# Patient Record
Sex: FEMALE | Race: WHITE | NOT HISPANIC OR LATINO | Employment: OTHER | URBAN - NONMETROPOLITAN AREA
[De-identification: names, ages, dates, MRNs, and addresses within clinical notes are randomized per-mention and may not be internally consistent; named-entity substitution may affect disease eponyms.]

---

## 2017-02-28 DIAGNOSIS — S86.912D: ICD-10-CM

## 2017-02-28 DIAGNOSIS — M25.569: Primary | ICD-10-CM

## 2017-03-01 ENCOUNTER — HOSPITAL ENCOUNTER (OUTPATIENT)
Dept: MRI IMAGING | Facility: HOSPITAL | Age: 62
Discharge: HOME | End: 2017-03-01
Attending: FAMILY MEDICINE | Admitting: FAMILY MEDICINE
Payer: COMMERCIAL

## 2017-03-01 PROCEDURE — 73721 MRI JNT OF LWR EXTRE W/O DYE: CPT | Mod: TC,LT

## 2017-05-22 DIAGNOSIS — I25.10 CAD (CORONARY ARTERY DISEASE): Primary | ICD-10-CM

## 2017-05-23 ENCOUNTER — TRANSFERRED RECORDS (OUTPATIENT)
Dept: HEALTH INFORMATION MANAGEMENT | Facility: HOSPITAL | Age: 62
End: 2017-05-23

## 2017-05-25 ENCOUNTER — APPOINTMENT (OUTPATIENT)
Dept: LAB | Facility: HOSPITAL | Age: 62
End: 2017-05-25
Attending: FAMILY MEDICINE
Payer: COMMERCIAL

## 2017-05-25 DIAGNOSIS — D50.8 IRON DEFICIENCY ANEMIA SECONDARY TO INADEQUATE DIETARY IRON INTAKE: Primary | ICD-10-CM

## 2017-05-25 LAB
ABO + RH BLD: NORMAL
ABO + RH BLD: NORMAL
BLD GP AB SCN SERPL QL: NORMAL
BLD PROD TYP BPU: NORMAL
BLOOD BANK CMNT PATIENT-IMP: NORMAL
NUM BPU REQUESTED: 2
SPECIMEN EXP DATE BLD: NORMAL

## 2017-05-25 PROCEDURE — 86850 RBC ANTIBODY SCREEN: CPT | Performed by: FAMILY MEDICINE

## 2017-05-25 PROCEDURE — 36415 COLL VENOUS BLD VENIPUNCTURE: CPT | Performed by: FAMILY MEDICINE

## 2017-05-25 PROCEDURE — 86900 BLOOD TYPING SEROLOGIC ABO: CPT | Performed by: FAMILY MEDICINE

## 2017-05-25 PROCEDURE — 86901 BLOOD TYPING SEROLOGIC RH(D): CPT | Performed by: FAMILY MEDICINE

## 2017-05-25 PROCEDURE — 86920 COMPATIBILITY TEST SPIN: CPT | Performed by: FAMILY MEDICINE

## 2017-05-26 ENCOUNTER — OFFICE VISIT (OUTPATIENT)
Dept: INFUSION THERAPY | Facility: HOSPITAL | Age: 62
End: 2017-05-26
Attending: FAMILY MEDICINE
Payer: COMMERCIAL

## 2017-05-26 VITALS
SYSTOLIC BLOOD PRESSURE: 127 MMHG | DIASTOLIC BLOOD PRESSURE: 64 MMHG | HEART RATE: 65 BPM | TEMPERATURE: 98.3 F | OXYGEN SATURATION: 100 % | RESPIRATION RATE: 16 BRPM

## 2017-05-26 DIAGNOSIS — D50.8 IRON DEFICIENCY ANEMIA SECONDARY TO INADEQUATE DIETARY IRON INTAKE: Primary | ICD-10-CM

## 2017-05-26 LAB
BLD PROD TYP BPU: NORMAL
BLD PROD TYP BPU: NORMAL
BLD UNIT ID BPU: 0
BLD UNIT ID BPU: 0
BLOOD PRODUCT CODE: NORMAL
BLOOD PRODUCT CODE: NORMAL
BPU ID: NORMAL
BPU ID: NORMAL
TRANSFUSION STATUS PATIENT QL: NORMAL

## 2017-05-26 PROCEDURE — P9016 RBC LEUKOCYTES REDUCED: HCPCS | Performed by: FAMILY MEDICINE

## 2017-05-26 PROCEDURE — 36430 TRANSFUSION BLD/BLD COMPNT: CPT

## 2017-05-26 ASSESSMENT — PAIN SCALES - GENERAL: PAINLEVEL: MODERATE PAIN (5)

## 2017-05-26 NOTE — MR AVS SNAPSHOT
"              After Visit Summary   5/26/2017    Rosa Camejo    MRN: [id number]           Patient Information     Date Of Birth          1955        Visit Information        Provider Department      5/26/2017 11:00 AM [facility]        Today's Diagnoses     Iron deficiency anemia secondary to inadequate dietary iron intake    -  1       Follow-ups after your visit        Your next 10 appointments already scheduled     Jun 02, 2017  6:30 AM CDT   Radiology with [facility]   [facility] ([facility] )    [address] [place] [id number] [phone]              Future tests that were ordered for you today     Open Standing Orders        Priority Remaining Interval Expires Ordered    Red blood cell prepare order unit STAT 98/100 CONDITIONAL (SPECIFY) BLOOD  5/25/2017    ABO/Rh type and screen Routine 14/16 AM DRAW  5/25/2017            Who to contact     If you have questions or need follow up information about today's clinic visit or your schedule please contact [facility] directly at [phone].  Normal or non-critical lab and imaging results will be communicated to you by [employer], letter or phone within 4 business days after the clinic has received the results. If you do not hear from us within 7 days, please contact the clinic through [employer] or phone. If you have a critical or abnormal lab result, we will notify you by phone as soon as possible.  Submit refill requests through [employer] or call your pharmacy and they will forward the refill request to us. Please allow 3 business days for your refill to be completed.          Additional Information About Your Visit        [employer] Information     [employer] lets you send messages to your doctor, view your test results, renew your prescriptions, schedule appointments and more. To sign up, go to [email or web address] . Click on \"Log in\" on the left side of the screen, which will take you to " "the Welcome page. Then click on \"Sign up Now\" on the right side of the page.     You will be asked to enter the access code listed below, as well as some personal information. Please follow the directions to create your username and password.     Your access code is: [email or web address]  Expires: 2017  3:34 PM     Your access code will  in 90 days. If you need help or a new code, please call your [facility] clinic or [phone].        Care EveryWhere ID     This is your Care EveryWhere ID. This could be used by other organizations to access your [facility] medical records  [id number]        Your Vitals Were     Pulse Temperature Respirations Pulse Oximetry          62 98.2  F (36.8  C) (Oral) 16 97%         Blood Pressure from Last 3 Encounters:   17 129/69    Weight from Last 3 Encounters:   No data found for Wt              We Performed the Following     ABO/Rh type and screen     Blood component     Blood component     Transfuse red blood cell unit     Transfuse red blood cell unit        Primary Care Provider Office Phone # Fax #    Efren Hinton [phone]       [facility] [address] [address]        Thank you!     Thank you for choosing [facility]  for your care. Our goal is always to provide you with excellent care. Hearing back from our patients is one way we can continue to improve our services. Please take a few minutes to complete the written survey that you may receive in the mail after your visit with us. Thank you!             Your Updated Medication List - Protect others around you: Learn how to safely use, store and throw away your medicines at www.disposemymeds.org.          This list is accurate as of: 17  3:34 PM.  Always use your most recent med list.                   Brand Name Dispense Instructions for use    ASPIRIN LOW DOSE 81 MG tablet   Generic drug:  aspirin      Take 81 mg by mouth daily       CYMBALTA PO      Take " by mouth daily       LORAZEPAM PO      Take 0.5 mg by mouth 2 times daily as needed for anxiety       METOPROLOL TARTRATE PO      Take by mouth daily       MIRTAZAPINE PO      Take 15 mg by mouth At Bedtime       PEPCID PO      Take by mouth 2 times daily       PRENATAL 1 PO      Take by mouth 2 times daily       SEROQUEL XR PO      Take 100 mg by mouth At Bedtime       TOPAMAX PO      Take by mouth daily

## 2017-05-26 NOTE — PROGRESS NOTES
1055:  Ambulated to room  6 independently.  Headache pain 5, Tylenol at 0500.  Call light in reach.  Pt here for blood transfusion.   1140:  Ist unit RBC started.  1150:  1st unit RBC infusing, no reaction noted   VSS.  Room service ordered.  1250:  VSS.  1st unit RBC infusing, no reaction noted. Pt ambulated to  independently.  1350:  1st unit RBC infused, no reaction noted.  No complaints at this time. Watching TV.  2nd unit RBC started.  1400:  2nd unit RBC infusing, no reaction noted.  1500:  2nd unit RBC infusing, no reaction noted.  VSS.  1600:  2nd unit RBC infused, no reaction noted.  VSS.  IV access discontinued, pressure dressing applied.  Pt discharged ambulatory to home.  Driven home by daughter.

## 2017-06-01 DIAGNOSIS — I25.10 CORONARY ATHEROSCLEROSIS OF NATIVE CORONARY ARTERY: Primary | ICD-10-CM

## 2017-06-02 ENCOUNTER — HOSPITAL ENCOUNTER (OUTPATIENT)
Dept: NUCLEAR MEDICINE | Facility: HOSPITAL | Age: 62
Discharge: HOME | End: 2017-06-02
Attending: NURSE PRACTITIONER | Admitting: NURSE PRACTITIONER
Payer: COMMERCIAL

## 2017-06-02 PROCEDURE — 78452 HT MUSCLE IMAGE SPECT MULT: CPT | Mod: TC

## 2017-06-02 PROCEDURE — 93017 CV STRESS TEST TRACING ONLY: CPT | Mod: TC

## 2017-06-02 PROCEDURE — A9500 TC99M SESTAMIBI: HCPCS | Mod: TC

## 2017-06-02 PROCEDURE — 93018 CV STRESS TEST I&R ONLY: CPT | Performed by: INTERNAL MEDICINE

## 2017-06-02 PROCEDURE — 93016 CV STRESS TEST SUPVJ ONLY: CPT | Performed by: INTERNAL MEDICINE

## 2017-06-02 RX ORDER — REGADENOSON 0.08 MG/ML
0.4 INJECTION, SOLUTION INTRAVENOUS ONCE
Status: COMPLETED | OUTPATIENT
Start: 2017-06-02 | End: 2017-06-02

## 2017-06-02 RX ADMIN — REGADENOSON 0.4 MG: 0.08 INJECTION, SOLUTION INTRAVENOUS at 08:32

## 2018-01-29 ENCOUNTER — HOSPITAL ENCOUNTER (OUTPATIENT)
Dept: MRI IMAGING | Facility: HOSPITAL | Age: 63
Discharge: HOME | End: 2018-01-29
Attending: FAMILY MEDICINE | Admitting: FAMILY MEDICINE
Payer: MEDICAID

## 2018-01-29 DIAGNOSIS — R42 DIZZINESS: ICD-10-CM

## 2018-01-29 PROCEDURE — 70553 MRI BRAIN STEM W/O & W/DYE: CPT | Mod: TC

## 2018-01-29 PROCEDURE — 25000128 H RX IP 250 OP 636: Performed by: RADIOLOGY

## 2018-01-29 PROCEDURE — A9585 GADOBUTROL INJECTION: HCPCS | Performed by: RADIOLOGY

## 2018-01-29 RX ORDER — GADOBUTROL 604.72 MG/ML
10 INJECTION INTRAVENOUS ONCE
Status: COMPLETED | OUTPATIENT
Start: 2018-01-29 | End: 2018-01-29

## 2018-01-29 RX ADMIN — GADOBUTROL 10 ML: 604.72 INJECTION INTRAVENOUS at 13:45

## 2019-07-27 ENCOUNTER — HOSPITAL ENCOUNTER (EMERGENCY)
Facility: HOSPITAL | Age: 64
Discharge: ANOTHER HOSPITAL | End: 2019-07-27
Attending: FAMILY MEDICINE | Admitting: FAMILY MEDICINE
Payer: COMMERCIAL

## 2019-07-27 ENCOUNTER — APPOINTMENT (OUTPATIENT)
Dept: GENERAL RADIOLOGY | Facility: HOSPITAL | Age: 64
End: 2019-07-27
Attending: FAMILY MEDICINE
Payer: COMMERCIAL

## 2019-07-27 ENCOUNTER — APPOINTMENT (OUTPATIENT)
Dept: CT IMAGING | Facility: HOSPITAL | Age: 64
End: 2019-07-27
Attending: FAMILY MEDICINE
Payer: COMMERCIAL

## 2019-07-27 VITALS — TEMPERATURE: 98.4 F | RESPIRATION RATE: 16 BRPM | HEART RATE: 74 BPM | OXYGEN SATURATION: 99 %

## 2019-07-27 DIAGNOSIS — R29.90 NEUROLOGICAL SYMPTOMS: Primary | ICD-10-CM

## 2019-07-27 LAB
ALBUMIN SERPL-MCNC: 3.5 G/DL (ref 3.4–5)
ALBUMIN UR-MCNC: NEGATIVE MG/DL
ALP SERPL-CCNC: 103 U/L (ref 40–150)
ALT SERPL W P-5'-P-CCNC: 14 U/L (ref 0–50)
ANION GAP SERPL CALCULATED.3IONS-SCNC: 5 MMOL/L (ref 3–14)
APPEARANCE UR: CLEAR
AST SERPL W P-5'-P-CCNC: 11 U/L (ref 0–45)
BACTERIA #/AREA URNS HPF: ABNORMAL /HPF
BASOPHILS # BLD AUTO: 0 10E9/L (ref 0–0.2)
BASOPHILS NFR BLD AUTO: 0.5 %
BILIRUB SERPL-MCNC: 0.8 MG/DL (ref 0.2–1.3)
BILIRUB UR QL STRIP: NEGATIVE
BUN SERPL-MCNC: 11 MG/DL (ref 7–30)
CALCIUM SERPL-MCNC: 8.7 MG/DL (ref 8.5–10.1)
CHLORIDE SERPL-SCNC: 107 MMOL/L (ref 94–109)
CO2 SERPL-SCNC: 28 MMOL/L (ref 20–32)
COLOR UR AUTO: ABNORMAL
CREAT SERPL-MCNC: 0.82 MG/DL (ref 0.52–1.04)
DIFFERENTIAL METHOD BLD: ABNORMAL
EOSINOPHIL # BLD AUTO: 0.1 10E9/L (ref 0–0.7)
EOSINOPHIL NFR BLD AUTO: 2 %
ERYTHROCYTE [DISTWIDTH] IN BLOOD BY AUTOMATED COUNT: 14.6 % (ref 10–15)
GFR SERPL CREATININE-BSD FRML MDRD: 76 ML/MIN/{1.73_M2}
GLUCOSE BLDC GLUCOMTR-MCNC: 96 MG/DL (ref 70–99)
GLUCOSE SERPL-MCNC: 104 MG/DL (ref 70–99)
GLUCOSE UR STRIP-MCNC: NEGATIVE MG/DL
HCT VFR BLD AUTO: 40.2 % (ref 35–47)
HGB BLD-MCNC: 12.7 G/DL (ref 11.7–15.7)
HGB UR QL STRIP: ABNORMAL
IMM GRANULOCYTES # BLD: 0 10E9/L (ref 0–0.4)
IMM GRANULOCYTES NFR BLD: 0.2 %
KETONES UR STRIP-MCNC: NEGATIVE MG/DL
LACTATE BLD-SCNC: 0.8 MMOL/L (ref 0.7–2)
LEUKOCYTE ESTERASE UR QL STRIP: ABNORMAL
LYMPHOCYTES # BLD AUTO: 1.3 10E9/L (ref 0.8–5.3)
LYMPHOCYTES NFR BLD AUTO: 22.1 %
MCH RBC QN AUTO: 25.2 PG (ref 26.5–33)
MCHC RBC AUTO-ENTMCNC: 31.6 G/DL (ref 31.5–36.5)
MCV RBC AUTO: 80 FL (ref 78–100)
MONOCYTES # BLD AUTO: 0.6 10E9/L (ref 0–1.3)
MONOCYTES NFR BLD AUTO: 9.8 %
MUCOUS THREADS #/AREA URNS LPF: PRESENT /LPF
NEUTROPHILS # BLD AUTO: 3.9 10E9/L (ref 1.6–8.3)
NEUTROPHILS NFR BLD AUTO: 65.4 %
NITRATE UR QL: NEGATIVE
NRBC # BLD AUTO: 0 10*3/UL
NRBC BLD AUTO-RTO: 0 /100
PH UR STRIP: 6 PH (ref 4.7–8)
PLATELET # BLD AUTO: 324 10E9/L (ref 150–450)
POTASSIUM SERPL-SCNC: 3.9 MMOL/L (ref 3.4–5.3)
PROT SERPL-MCNC: 7.6 G/DL (ref 6.8–8.8)
RBC # BLD AUTO: 5.03 10E12/L (ref 3.8–5.2)
RBC #/AREA URNS AUTO: 3 /HPF (ref 0–2)
SODIUM SERPL-SCNC: 140 MMOL/L (ref 133–144)
SOURCE: ABNORMAL
SP GR UR STRIP: 1.01 (ref 1–1.03)
SQUAMOUS #/AREA URNS AUTO: 2 /HPF (ref 0–1)
TROPONIN I SERPL-MCNC: <0.015 UG/L (ref 0–0.04)
UROBILINOGEN UR STRIP-MCNC: NORMAL MG/DL (ref 0–2)
WBC # BLD AUTO: 6 10E9/L (ref 4–11)
WBC #/AREA URNS AUTO: 8 /HPF (ref 0–5)

## 2019-07-27 PROCEDURE — 25000128 H RX IP 250 OP 636

## 2019-07-27 PROCEDURE — 36415 COLL VENOUS BLD VENIPUNCTURE: CPT | Performed by: FAMILY MEDICINE

## 2019-07-27 PROCEDURE — 71045 X-RAY EXAM CHEST 1 VIEW: CPT | Mod: TC

## 2019-07-27 PROCEDURE — 85025 COMPLETE CBC W/AUTO DIFF WBC: CPT | Performed by: FAMILY MEDICINE

## 2019-07-27 PROCEDURE — 00000146 ZZHCL STATISTIC GLUCOSE BY METER IP

## 2019-07-27 PROCEDURE — 83605 ASSAY OF LACTIC ACID: CPT | Performed by: FAMILY MEDICINE

## 2019-07-27 PROCEDURE — 99285 EMERGENCY DEPT VISIT HI MDM: CPT | Mod: Z6 | Performed by: FAMILY MEDICINE

## 2019-07-27 PROCEDURE — 84484 ASSAY OF TROPONIN QUANT: CPT | Performed by: FAMILY MEDICINE

## 2019-07-27 PROCEDURE — 70450 CT HEAD/BRAIN W/O DYE: CPT | Mod: TC

## 2019-07-27 PROCEDURE — 81001 URINALYSIS AUTO W/SCOPE: CPT | Performed by: FAMILY MEDICINE

## 2019-07-27 PROCEDURE — 93010 ELECTROCARDIOGRAM REPORT: CPT | Performed by: INTERNAL MEDICINE

## 2019-07-27 PROCEDURE — 99291 CRITICAL CARE FIRST HOUR: CPT | Mod: 25

## 2019-07-27 PROCEDURE — 87086 URINE CULTURE/COLONY COUNT: CPT | Performed by: FAMILY MEDICINE

## 2019-07-27 PROCEDURE — 80053 COMPREHEN METABOLIC PANEL: CPT | Performed by: FAMILY MEDICINE

## 2019-07-27 PROCEDURE — 93005 ELECTROCARDIOGRAM TRACING: CPT

## 2019-07-27 RX ORDER — ONDANSETRON 2 MG/ML
INJECTION INTRAMUSCULAR; INTRAVENOUS
Status: COMPLETED
Start: 2019-07-27 | End: 2019-07-27

## 2019-07-27 RX ORDER — IOPAMIDOL 755 MG/ML
50 INJECTION, SOLUTION INTRAVASCULAR ONCE
Status: DISCONTINUED | OUTPATIENT
Start: 2019-07-27 | End: 2019-07-27 | Stop reason: HOSPADM

## 2019-07-27 RX ADMIN — ONDANSETRON 4 MG: 2 INJECTION INTRAMUSCULAR; INTRAVENOUS at 14:03

## 2019-07-27 ASSESSMENT — ENCOUNTER SYMPTOMS
PALPITATIONS: 0
HEADACHES: 0
ARTHRALGIAS: 0
NECK STIFFNESS: 0
DIFFICULTY URINATING: 0
CONFUSION: 0
FEVER: 0
ABDOMINAL PAIN: 0
COUGH: 0
EYE REDNESS: 0
COLOR CHANGE: 0
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
## 2019-07-29 LAB
BACTERIA SPEC CULT: NORMAL
SPECIMEN SOURCE: NORMAL

## 2019-09-30 ENCOUNTER — DOCUMENTATION ONLY (OUTPATIENT)
Dept: INTERVENTIONAL RADIOLOGY/VASCULAR | Facility: HOSPITAL | Age: 64
End: 2019-09-30

## 2019-09-30 RX ORDER — ATORVASTATIN CALCIUM 80 MG/1
80 TABLET, FILM COATED ORAL AT BEDTIME
COMMUNITY
Start: 2019-07-31 | End: 2022-10-21

## 2019-09-30 RX ORDER — ASPIRIN 81 MG/1
81 TABLET, CHEWABLE ORAL DAILY
COMMUNITY
Start: 2019-08-01 | End: 2022-10-21

## 2019-09-30 RX ORDER — DULOXETIN HYDROCHLORIDE 60 MG/1
60 CAPSULE, DELAYED RELEASE ORAL AT BEDTIME
COMMUNITY
Start: 2019-06-13 | End: 2023-07-17

## 2019-09-30 RX ORDER — SUCRALFATE 1 G/1
1 TABLET ORAL
COMMUNITY
Start: 2019-06-25 | End: 2022-10-21

## 2019-09-30 RX ORDER — DULOXETIN HYDROCHLORIDE 30 MG/1
30 CAPSULE, DELAYED RELEASE ORAL AT BEDTIME
COMMUNITY
Start: 2019-06-13 | End: 2022-10-21

## 2019-09-30 RX ORDER — MULTIVIT-MIN/IRON/FOLIC ACID/K 18-600-40
2000 CAPSULE ORAL DAILY
COMMUNITY
Start: 2018-09-12 | End: 2022-10-21

## 2019-09-30 RX ORDER — LANOLIN ALCOHOL/MO/W.PET/CERES
1000 CREAM (GRAM) TOPICAL DAILY
COMMUNITY

## 2019-09-30 RX ORDER — METOPROLOL TARTRATE 50 MG
25 TABLET ORAL 2 TIMES DAILY
COMMUNITY
Start: 2019-07-31 | End: 2023-06-21

## 2019-10-01 ENCOUNTER — TELEPHONE (OUTPATIENT)
Dept: NUCLEAR MEDICINE | Facility: HOSPITAL | Age: 64
End: 2019-10-01

## 2019-10-01 NOTE — TELEPHONE ENCOUNTER
Appointment Reminder Call    -Exam prep  -When and where to register  -Exam length with waiting time.  Recommended book or crossword puzzle.    -Asked patient to bring in an updated list of medications and allergies.    -PADMINI Cantu

## 2019-10-02 ENCOUNTER — HOSPITAL ENCOUNTER (OUTPATIENT)
Dept: NUCLEAR MEDICINE | Facility: HOSPITAL | Age: 64
Setting detail: NUCLEAR MEDICINE
Discharge: HOME | End: 2019-10-02
Attending: FAMILY MEDICINE | Admitting: FAMILY MEDICINE
Payer: MEDICAID

## 2019-10-02 ENCOUNTER — HOSPITAL ENCOUNTER (OUTPATIENT)
Dept: CARDIOLOGY | Facility: HOSPITAL | Age: 64
Setting detail: NUCLEAR MEDICINE
End: 2019-10-02
Attending: FAMILY MEDICINE
Payer: MEDICAID

## 2019-10-02 ENCOUNTER — HOSPITAL ENCOUNTER (OUTPATIENT)
Dept: NUCLEAR MEDICINE | Facility: HOSPITAL | Age: 64
Setting detail: NUCLEAR MEDICINE
End: 2019-10-02
Attending: FAMILY MEDICINE
Payer: MEDICAID

## 2019-10-02 DIAGNOSIS — R55 SYNCOPE: ICD-10-CM

## 2019-10-02 DIAGNOSIS — I47.10 PSVT (PAROXYSMAL SUPRAVENTRICULAR TACHYCARDIA) (H): ICD-10-CM

## 2019-10-02 PROCEDURE — A9500 TC99M SESTAMIBI: HCPCS | Performed by: RADIOLOGY

## 2019-10-02 PROCEDURE — 93017 CV STRESS TEST TRACING ONLY: CPT | Performed by: INTERNAL MEDICINE

## 2019-10-02 PROCEDURE — 78452 HT MUSCLE IMAGE SPECT MULT: CPT | Mod: TC

## 2019-10-02 PROCEDURE — 25000128 H RX IP 250 OP 636: Performed by: INTERNAL MEDICINE

## 2019-10-02 PROCEDURE — 93018 CV STRESS TEST I&R ONLY: CPT | Performed by: INTERNAL MEDICINE

## 2019-10-02 PROCEDURE — 93016 CV STRESS TEST SUPVJ ONLY: CPT | Performed by: INTERNAL MEDICINE

## 2019-10-02 PROCEDURE — 34300033 ZZH RX 343: Performed by: RADIOLOGY

## 2019-10-02 RX ORDER — REGADENOSON 0.08 MG/ML
0.4 INJECTION, SOLUTION INTRAVENOUS ONCE
Status: COMPLETED | OUTPATIENT
Start: 2019-10-02 | End: 2019-10-02

## 2019-10-02 RX ORDER — AMINOPHYLLINE 25 MG/ML
INJECTION, SOLUTION INTRAVENOUS
Status: DISCONTINUED
Start: 2019-10-02 | End: 2019-10-02 | Stop reason: WASHOUT

## 2019-10-02 RX ADMIN — Medication 30 MILLICURIE: at 09:32

## 2019-10-02 RX ADMIN — Medication 10 MILLICURIE: at 07:34

## 2019-10-02 RX ADMIN — REGADENOSON 0.4 MG: 0.08 INJECTION, SOLUTION INTRAVENOUS at 09:32

## 2021-05-20 ENCOUNTER — TRANSFERRED RECORDS (OUTPATIENT)
Dept: HEALTH INFORMATION MANAGEMENT | Facility: CLINIC | Age: 66
End: 2021-05-20

## 2022-05-24 ENCOUNTER — TRANSFERRED RECORDS (OUTPATIENT)
Dept: HEALTH INFORMATION MANAGEMENT | Facility: CLINIC | Age: 67
End: 2022-05-24

## 2022-10-19 PROBLEM — E55.9 VITAMIN D DEFICIENCY: Status: ACTIVE | Noted: 2019-02-04

## 2022-10-19 PROBLEM — R29.90 EPISODE OF TRANSIENT NEUROLOGIC SYMPTOMS: Status: ACTIVE | Noted: 2019-07-27

## 2022-10-19 PROBLEM — R73.02 IMPAIRED GLUCOSE TOLERANCE: Status: ACTIVE | Noted: 2019-07-28

## 2022-10-19 PROBLEM — R73.01 IMPAIRED FASTING GLUCOSE: Status: ACTIVE | Noted: 2022-10-19

## 2022-10-19 PROBLEM — E78.5 HYPERLIPIDEMIA: Status: ACTIVE | Noted: 2022-10-19

## 2022-10-19 PROBLEM — E66.01 MORBID OBESITY WITH BMI OF 40.0-44.9, ADULT (H): Status: ACTIVE | Noted: 2018-09-11

## 2022-10-19 NOTE — PROGRESS NOTES
"  Assessment & Plan     1. Encounter to establish care  Notes reviewed     2. Daytime somnolence  - Adult Sleep Eval & Management  Referral; Future    3. Pain of toe of left foot  - XR Toe Left G/E 2 Views; Future - will come in on Monday for XR as we do not have this service today.     4. Impaired fasting glucose  Start ozempic  - Hemoglobin A1c  - semaglutide (OZEMPIC, 0.25 OR 0.5 MG/DOSE,) 2 MG/1.5ML SOPN pen; Inject 0.25 mg Subcutaneous every 7 days  Dispense: 3 mL; Refill: 1    5. Hyperlipidemia LDL goal <100  - Lipid Profile    6. Essential hypertension  - Comprehensive metabolic panel  - TSH with free T4 reflex    7. Mild recurrent major depression (H)  Continue following with mental health     8. Generalized anxiety disorder  As above    9. On statin therapy  - Comprehensive metabolic panel    10. Morbid obesity (H)  - semaglutide (OZEMPIC, 0.25 OR 0.5 MG/DOSE,) 2 MG/1.5ML SOPN pen; Inject 0.25 mg Subcutaneous every 7 days  Dispense: 3 mL; Refill: 1         BMI:   Estimated body mass index is 47.18 kg/m  as calculated from the following:    Height as of this encounter: 1.651 m (5' 5\").    Weight as of this encounter: 128.6 kg (283 lb 8 oz).   Weight management plan: Discussed healthy diet and exercise guidelines    Follow-up in 1 month or as needed     Amanda Meehan, NP  [facility] [place] [facility]    Subjective   Rosa is a 67 year old, presenting for the following health issues:  Establish Care      HPI     Rosa is here today to establish care.  Her PCP is retiring.  She has a history of elevated glucose level, elevated lipids, depression and anxiety.  She does follow with mental health.  She stopped taking lipitor.      Concern - left foot 3rd digit   Onset: middle of February   Description: fractured toe   Intensity: mild  Progression of Symptoms:  same  Accompanying Signs & Symptoms: swelling noted at night and redness and sharp pain.    Previous history of similar problem: " "yes   Precipitating factors:        Worsened by: wearing shoe - pressure hurts   Alleviating factors:        Improved by: nothing   Therapies tried and outcome: tylenol      Concern - weight management   Onset: ongoing   Description: hard time loosing weight   Intensity: severe  Progression of Symptoms:  worsening  Accompanying Signs & Symptoms: over weight   Previous history of similar problem: yes hx of gastric bypass about 13 years ago.  Was down to 125 pounds.  Weight has gradually increased over the past 7 years.   Precipitating factors:        Worsened by: unknown   Alleviating factors:        Improved by: nothing   Therapies tried and outcome: gastric bypass, watching what eats and is trying to get into fitness center.  Interested in shot for weight loss has not seen dietician recently     Would also like to get sleep study done for sleep apnea, she snores and is very tired dueing the day.        Review of Systems   Constitutional, HEENT, cardiovascular, pulmonary, gi and gu systems are negative, except as otherwise noted.      Objective    /72 (BP Location: Left arm, Patient Position: Chair, Cuff Size: Adult Regular)   Pulse 65   Temp 98  F (36.7  C) (Tympanic)   Resp 16   Ht 1.651 m (5' 5\")   Wt 128.6 kg (283 lb 8 oz)   SpO2 98%   BMI 47.18 kg/m    Body mass index is 47.18 kg/m .  Physical Exam   GENERAL: healthy, alert and no distress, obese  RESP: lungs clear to auscultation - no rales, rhonchi or wheezes  CV: regular rate and rhythm, normal S1 S2, no S3 or S4, no murmur, click or rub, no peripheral edema and peripheral pulses strong  MS: left foot:  3rd toe is tender with palpation, mildly swollen, no erythema or skin breakdown.    PSYCH: mentation appears normal, affect normal/bright                  "

## 2022-10-21 ENCOUNTER — TELEPHONE (OUTPATIENT)
Dept: FAMILY MEDICINE | Facility: OTHER | Age: 67
End: 2022-10-21

## 2022-10-21 ENCOUNTER — OFFICE VISIT (OUTPATIENT)
Dept: FAMILY MEDICINE | Facility: OTHER | Age: 67
End: 2022-10-21
Attending: NURSE PRACTITIONER
Payer: MEDICARE

## 2022-10-21 VITALS
SYSTOLIC BLOOD PRESSURE: 124 MMHG | DIASTOLIC BLOOD PRESSURE: 72 MMHG | RESPIRATION RATE: 16 BRPM | OXYGEN SATURATION: 98 % | TEMPERATURE: 98 F | HEART RATE: 65 BPM | WEIGHT: 283.5 LBS | HEIGHT: 65 IN | BODY MASS INDEX: 47.23 KG/M2

## 2022-10-21 DIAGNOSIS — M79.675 PAIN OF TOE OF LEFT FOOT: ICD-10-CM

## 2022-10-21 DIAGNOSIS — I10 ESSENTIAL HYPERTENSION: ICD-10-CM

## 2022-10-21 DIAGNOSIS — E66.01 MORBID OBESITY (H): ICD-10-CM

## 2022-10-21 DIAGNOSIS — R73.01 IMPAIRED FASTING GLUCOSE: ICD-10-CM

## 2022-10-21 DIAGNOSIS — Z79.899 ON STATIN THERAPY: ICD-10-CM

## 2022-10-21 DIAGNOSIS — R40.0 DAYTIME SOMNOLENCE: ICD-10-CM

## 2022-10-21 DIAGNOSIS — R73.01 IMPAIRED FASTING GLUCOSE: Primary | ICD-10-CM

## 2022-10-21 DIAGNOSIS — Z76.89 ENCOUNTER TO ESTABLISH CARE: Primary | ICD-10-CM

## 2022-10-21 DIAGNOSIS — E78.5 HYPERLIPIDEMIA LDL GOAL <100: ICD-10-CM

## 2022-10-21 DIAGNOSIS — F41.1 GENERALIZED ANXIETY DISORDER: ICD-10-CM

## 2022-10-21 DIAGNOSIS — F33.0 MILD RECURRENT MAJOR DEPRESSION (H): ICD-10-CM

## 2022-10-21 PROBLEM — I47.10 SVT (SUPRAVENTRICULAR TACHYCARDIA) (H): Status: ACTIVE | Noted: 2019-12-31

## 2022-10-21 PROBLEM — R94.39 POSITIVE CARDIAC STRESS TEST: Status: ACTIVE | Noted: 2019-12-31

## 2022-10-21 PROBLEM — R55 SYNCOPE, UNSPECIFIED SYNCOPE TYPE: Status: ACTIVE | Noted: 2019-10-09

## 2022-10-21 PROBLEM — I47.19 ATRIAL TACHYCARDIA (H): Status: ACTIVE | Noted: 2020-01-29

## 2022-10-21 LAB
ALBUMIN SERPL BCG-MCNC: 4.2 G/DL (ref 3.5–5.2)
ALP SERPL-CCNC: 90 U/L (ref 35–104)
ALT SERPL W P-5'-P-CCNC: 13 U/L (ref 10–35)
ANION GAP SERPL CALCULATED.3IONS-SCNC: 10 MMOL/L (ref 7–15)
AST SERPL W P-5'-P-CCNC: 18 U/L (ref 10–35)
BILIRUB SERPL-MCNC: 1.1 MG/DL
BUN SERPL-MCNC: 15.8 MG/DL (ref 8–23)
CALCIUM SERPL-MCNC: 9 MG/DL (ref 8.8–10.2)
CHLORIDE SERPL-SCNC: 102 MMOL/L (ref 98–107)
CHOLEST SERPL-MCNC: 152 MG/DL
CREAT SERPL-MCNC: 0.84 MG/DL (ref 0.51–0.95)
DEPRECATED HCO3 PLAS-SCNC: 27 MMOL/L (ref 22–29)
EST. AVERAGE GLUCOSE BLD GHB EST-MCNC: 128 MG/DL
GFR SERPL CREATININE-BSD FRML MDRD: 76 ML/MIN/1.73M2
GLUCOSE SERPL-MCNC: 98 MG/DL (ref 70–99)
HBA1C MFR BLD: 6.1 %
HDLC SERPL-MCNC: 56 MG/DL
LDLC SERPL CALC-MCNC: 75 MG/DL
NONHDLC SERPL-MCNC: 96 MG/DL
POTASSIUM SERPL-SCNC: 4.3 MMOL/L (ref 3.4–5.3)
PROT SERPL-MCNC: 7.3 G/DL (ref 6.4–8.3)
SODIUM SERPL-SCNC: 139 MMOL/L (ref 136–145)
TRIGL SERPL-MCNC: 104 MG/DL
TSH SERPL DL<=0.005 MIU/L-ACNC: 1.16 UIU/ML (ref 0.3–4.2)

## 2022-10-21 PROCEDURE — G0463 HOSPITAL OUTPT CLINIC VISIT: HCPCS

## 2022-10-21 PROCEDURE — 36415 COLL VENOUS BLD VENIPUNCTURE: CPT | Mod: ZL | Performed by: NURSE PRACTITIONER

## 2022-10-21 PROCEDURE — 80061 LIPID PANEL: CPT | Mod: ZL | Performed by: NURSE PRACTITIONER

## 2022-10-21 PROCEDURE — 84443 ASSAY THYROID STIM HORMONE: CPT | Mod: ZL | Performed by: NURSE PRACTITIONER

## 2022-10-21 PROCEDURE — 80053 COMPREHEN METABOLIC PANEL: CPT | Mod: ZL | Performed by: NURSE PRACTITIONER

## 2022-10-21 PROCEDURE — 99204 OFFICE O/P NEW MOD 45 MIN: CPT | Performed by: NURSE PRACTITIONER

## 2022-10-21 PROCEDURE — 83036 HEMOGLOBIN GLYCOSYLATED A1C: CPT | Mod: ZL | Performed by: NURSE PRACTITIONER

## 2022-10-21 RX ORDER — AMLODIPINE BESYLATE 2.5 MG/1
2.5 TABLET ORAL DAILY
COMMUNITY
Start: 2022-09-19

## 2022-10-21 RX ORDER — ZOLPIDEM TARTRATE 12.5 MG/1
12.5 TABLET, FILM COATED, EXTENDED RELEASE ORAL
COMMUNITY
Start: 2022-06-24 | End: 2024-04-19

## 2022-10-21 RX ORDER — CLONAZEPAM 1 MG/1
1 TABLET ORAL 2 TIMES DAILY PRN
COMMUNITY
Start: 2022-09-23 | End: 2024-05-21

## 2022-10-21 RX ORDER — ROSUVASTATIN CALCIUM 10 MG/1
10 TABLET, COATED ORAL DAILY
COMMUNITY
Start: 2022-09-16 | End: 2023-03-10

## 2022-10-21 RX ORDER — ALBUTEROL SULFATE 90 UG/1
AEROSOL, METERED RESPIRATORY (INHALATION)
COMMUNITY
Start: 2022-10-12 | End: 2023-02-13

## 2022-10-21 RX ORDER — OMEPRAZOLE 40 MG/1
CAPSULE, DELAYED RELEASE ORAL
COMMUNITY
Start: 2022-09-08 | End: 2023-07-12

## 2022-10-21 RX ORDER — SEMAGLUTIDE 1.34 MG/ML
0.25 INJECTION, SOLUTION SUBCUTANEOUS
Qty: 3 ML | Refills: 1 | Status: SHIPPED | OUTPATIENT
Start: 2022-10-21 | End: 2022-10-24

## 2022-10-21 ASSESSMENT — ANXIETY QUESTIONNAIRES
IF YOU CHECKED OFF ANY PROBLEMS ON THIS QUESTIONNAIRE, HOW DIFFICULT HAVE THESE PROBLEMS MADE IT FOR YOU TO DO YOUR WORK, TAKE CARE OF THINGS AT HOME, OR GET ALONG WITH OTHER PEOPLE: SOMEWHAT DIFFICULT
6. BECOMING EASILY ANNOYED OR IRRITABLE: SEVERAL DAYS
2. NOT BEING ABLE TO STOP OR CONTROL WORRYING: MORE THAN HALF THE DAYS
GAD7 TOTAL SCORE: 12
1. FEELING NERVOUS, ANXIOUS, OR ON EDGE: SEVERAL DAYS
3. WORRYING TOO MUCH ABOUT DIFFERENT THINGS: MORE THAN HALF THE DAYS
GAD7 TOTAL SCORE: 12
4. TROUBLE RELAXING: MORE THAN HALF THE DAYS
5. BEING SO RESTLESS THAT IT IS HARD TO SIT STILL: SEVERAL DAYS
7. FEELING AFRAID AS IF SOMETHING AWFUL MIGHT HAPPEN: NEARLY EVERY DAY

## 2022-10-21 ASSESSMENT — PAIN SCALES - GENERAL: PAINLEVEL: MILD PAIN (3)

## 2022-10-21 ASSESSMENT — PATIENT HEALTH QUESTIONNAIRE - PHQ9: SUM OF ALL RESPONSES TO PHQ QUESTIONS 1-9: 13

## 2022-10-21 NOTE — PATIENT INSTRUCTIONS
Assessment & Plan     1. Encounter to establish care  Notes reviewed     2. Daytime somnolence  - Adult Sleep Eval & Management  Referral; Future    3. Pain of toe of left foot  - XR Toe Left G/E 2 Views; Future - will come in on Monday for XR    4. Impaired fasting glucose  Start ozempic  - Hemoglobin A1c  - semaglutide (OZEMPIC, 0.25 OR 0.5 MG/DOSE,) 2 MG/1.5ML SOPN pen; Inject 0.25 mg Subcutaneous every 7 days  Dispense: 3 mL; Refill: 1    5. Hyperlipidemia LDL goal <100  - Lipid Profile    6. Essential hypertension  - Comprehensive metabolic panel  - TSH with free T4 reflex    7. Mild recurrent major depression (H)  Continue following with mental health     8. Generalized anxiety disorder  As above    9. On statin therapy  - Comprehensive metabolic panel    10. Morbid obesity (H)  - semaglutide (OZEMPIC, 0.25 OR 0.5 MG/DOSE,) 2 MG/1.5ML SOPN pen; Inject 0.25 mg Subcutaneous every 7 days  Dispense: 3 mL; Refill: 1    Amanda Meehan,   Certified Adult Nurse Practitioner  [phone]

## 2022-10-21 NOTE — TELEPHONE ENCOUNTER
Received PA from [employer] for Ozempic (0.25 or 0.5 MG/DOSE) 2MG/1.5ML pen-injectors. Submitted on CMM. Waiting for a response.

## 2022-10-24 ENCOUNTER — ANCILLARY PROCEDURE (OUTPATIENT)
Dept: GENERAL RADIOLOGY | Facility: OTHER | Age: 67
End: 2022-10-24
Attending: NURSE PRACTITIONER
Payer: MEDICARE

## 2022-10-24 ENCOUNTER — TELEPHONE (OUTPATIENT)
Dept: FAMILY MEDICINE | Facility: OTHER | Age: 67
End: 2022-10-24

## 2022-10-24 DIAGNOSIS — M79.675 PAIN OF TOE OF LEFT FOOT: ICD-10-CM

## 2022-10-24 PROCEDURE — 73660 X-RAY EXAM OF TOE(S): CPT | Mod: TC,LT,FY

## 2022-10-24 NOTE — TELEPHONE ENCOUNTER
Lab Results   Component Value Date    A1C 6.1 10/21/2022     ozempic not covered, would she like to try metformin?

## 2022-10-24 NOTE — TELEPHONE ENCOUNTER
Metformin sent to pharmacy.      Please review starting instructions:    Metformin start:   Week 1:  One tablet (500mg) with breakfast   Week 2:  One tablet (500mg) with breakfast and supper   Week 3:  Two tablets (1000mg) with breakfast and one tablet (500mg) with supper   Week 4:  Two tablets (1000mg) with breakfast and supper - please stay on this dose.

## 2022-10-24 NOTE — TELEPHONE ENCOUNTER
Received DENIAL from [employer] for Ozempic (0.25 or 0.5 MG/DOSE) 2MG/1.5ML pen-injectors. 10/222/2022    Forms scanned to [facility].

## 2022-11-09 ENCOUNTER — DOCUMENTATION ONLY (OUTPATIENT)
Dept: SLEEP MEDICINE | Facility: HOSPITAL | Age: 67
End: 2022-11-09

## 2022-11-09 NOTE — PROGRESS NOTES
SLEEP HISTORY QUESTIONNAIRE    Please describe the main reason for your sleep appointment? Have hard time falling asleep and staying asleep. I wake up 3-4 times a night.    How long has this been a problem? About a year    Have you been diagnosed with a sleep problem in the past? NO    If so, what? n/a    What treatment was recommended? n/a    Have you had a sleep study in the past? NO    If yes, where and when? n/a    Sleep Habits:   Do you read in bed? Yes  Do you eat in bed? Yes  Do you watch TV in bed? Yes  Do you work in bed? No  Do you use a phone or computer in bed? Yes    Is you sleep disturbed by:   Bed partner: Yes  Children: No  Noise: No   Pets: Yes  Other: no      On two or more nights per week, do you drink alcohol to help you fall asleep?NO    On two or more nights per week, do you take melatonin to help you fall asleep? NO    On two or more nights per week, do you take over the counter medicine to fall asleep?  NO    Do you take drinks with caffeine (coffee, tea, soda, energy drinks)? YES    Do you have 3 or more caffeine drinks in a day? NO    Do you have caffeine drinks within 6 hours of bedtime? YES    Do you smoke or use tobacco? NO    Do you exercise? YES    Sleep Routine:   Using a 24 Hour Clock    What time do you usually get into bed on workdays? 10pm    Weekend/non work days? 10pm    What time do you get out of bed on workdays? 5-7am      Weekend/non work days?5-7am    Do you work the evening or night shift or do your shifts rotate? NO    How long does it usually take to fall to sleep? 45 min    How many times do you wake during the night? 3-4 times    How much time do you feel that you are awake during the entire night? 3 hours    How long does it take for you to fall back to sleep after you wake up? 1 hour    Why do you think you wake up? Things going through mind/bad dreams    What do you do when you wake up? Get up for a few hours    How much sleep do you think you get on work nights?  4-5 hours    How much sleep do you think you get on weekends/non work days? 4-5 hours    How much sleep do you think you need to feel your best? 8-10 hours    How many days during a week do you take a nap on average? Every day    What is the average length of your naps? 1-2 hours    Do you feel better after taking a nap? YES    If you could chose the best sleep schedule for you, what time would you go to bed? 11pm  What time would you get up? 8/830am    Do you read in bed? YES    Do you eat in bed? YES    Do you watch TV in bed? YES    Do you do work in bed? NO    Do you use a computer or phone in bed? YES    Sleep Disruptions?   Leg movements:  Do you ever have restless, crawling, aching or other unusual feelings in your legs? YES    Do you ever wake yourself by kicking your legs during the night? YES    Are the sheets and blankets messed up or tossed about when you get up? NO    Night-time behaviors:   Do you have nightmares or night terrors? YES   How often? 2-3/week    Have you had times when you were sleep walking? NO    Have you been seen doing anything unusual while you sleep at nights? YES  What? Talking or screaming in sleep  How often? n/a    Have you ever hurt yourself or someone else while you were sleeping? NO  Please describe: n/a    Do you clench or grind your teeth during the night? no    Sleep Apnea (pauses in breathing during sleep):  Do you wake with a headache in the morning? YES  How often? everyday    Does your bed partner, family or friends ever say that you snore? YES  How many nights per week do you snore? moderate  Can snoring be heard outside the bedroom? no    Do you ever wake yourself up from snoring, gasping or choking? YES    Have you ever been told that you stop breathing or have pauses in your breathing? NO    Do you wake in the morning with a dry throat or mouth? YES    Do you have trouble breathing through your nose? YES    Do you have problems with heartburn, reflux or a hiatal  hernia? ?    Which positions do you usually sleep in? (stomach, back, sides, all) back, sides    Do you use oxygen or any other medical equipment when you sleep? NO    Do members of your family (related by blood) snore? YES    Have any members of your family been diagnosed with with sleep apnea? NO    Do other members of your family have restless leg? NO    Do other members of your family have sleep walking? NO    Have you ever had an accident, or near accident due to sleepiness while driving? NO    Does your sleepiness affect your work on the job or at school? NO    Do you ever fall asleep by accident while doing a task? NO    Have you had sudden muscle weakness when laughing, angry or surprised? YES    Have you ever been unable to move your body when falling asleep or waking up? YES    Do you ever have trouble  your dreams from real life events? NO  Please describe: n/a    Physical Health: (including illness and injury): During the past 30 days, on how many days was your physical health not good? 0/30 days     Mental Health: (including stress, depression, and problems with emotions): During the last 30 days, how may days was your mental health not good? 25/30 days.     During the past 30 days, on how many days did poor physical or mental health keep you from doing your usual activities? This might be self-care, work, or play? 30 days.     Social History:   Marital status:     Who lives in your home with you? Daughter, daughter in law, ex     Mother (alive or dead)? dead If has , from what? CHF/diabetes  Father (alive or dead)? dead If has , from what? CHF    Siblings: YES  Have any ? YES  If so, from what? 3 brothers  from cancer, one from heart    Currently working? 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     Sleepiness Scale:   Sitting and reading 2   Watching TV 2   Sitting in a public place 1   Riding in a car 2   Lying down to rest in the  afternoon 1   Sitting and talking to someone 0   Sitting quietly after a lunch without alcohol 1   In a car, stopping for a few minutes in traffic 0       Surgical History:   Past Surgical History:   Procedure Laterality Date     APPENDECTOMY       CHOLECYSTECTOMY       GASTRIC BYPASS       HYSTERECTOMY       ORTHOPEDIC SURGERY Bilateral     knee       Medical Conditions:   Past Medical History:   Diagnosis Date     Anemia      Chronic osteoarthritis      Depressive disorder      Gastroesophageal reflux disease      Hyperlipidemia      Hypertension        Medications:   Current Outpatient Medications   Medication Sig     albuterol (PROAIR HFA/PROVENTIL HFA/VENTOLIN HFA) 108 (90 Base) MCG/ACT inhaler USE 2 PUFFS BY INHALATION EVERY 4 HOURS AS NEEDED FOR SHORTNESS OF BREATH OR WHEEZING (Patient not taking: Reported on 10/21/2022)     amLODIPine (NORVASC) 2.5 MG tablet Take 2.5 mg by mouth daily     aspirin (ASA) 81 MG tablet Take 81 mg by mouth daily     clonazePAM (KLONOPIN) 1 MG tablet Take 1 mg by mouth 2 times daily as needed     cyanocobalamin (VITAMIN B-12) 1000 MCG tablet Take 1,000 mcg by mouth daily     DULoxetine (CYMBALTA) 60 MG capsule Take 60 mg by mouth At Bedtime     FERROUS SULFATE DRIED PO Take 1 tablet by mouth daily     metFORMIN (GLUCOPHAGE) 500 MG tablet Take 1 tablet (500 mg) by mouth 2 times daily (with meals)     metoprolol tartrate (LOPRESSOR) 50 MG tablet Take 25 mg by mouth 2 times daily     omeprazole (PRILOSEC) 40 MG DR capsule TAKE 1 CAPSULE BY MOUTH TWO TIMES A DAY BEFORE MEALS. PLEASE SCHEDULE FOLLOW UP CLINIC APPOINTMENT FOR FURTHER REFILLS.     rosuvastatin (CRESTOR) 10 MG tablet Take 10 mg by mouth daily     zolpidem ER (AMBIEN CR) 12.5 MG CR tablet Take 12.5 mg by mouth     No current facility-administered medications for this visit.       Are you currently having any of the following symptoms?   General:   Obvious weight gain or loss YES  Fever, chills or sweats NO  Drug allergies:  amoxicillin, penicillin, demerol     Eyes:   Changes in vision NO  Blind spots NO  Double vision NO  Other blurry eyes    Ear, Nose and Throat:   Ear pain NO  Sore throat NO  Sinus pain NO  Post-nasal drip NO  Runny nose NO  Bloody nose NO    Heart:   Rapid or irregular heart beat YES  Chest pain or pressure NO  Out of breath when lying down YES  Swelling in feet or legs YES  High blood pressure YES  Heart disease NO    Nervous system   Headaches NO  Weakness in arms or legs NO  Numbness in arms of legs NO  Other: no    Skin  Rashes NO  New moles or skin changes NO  Other no    Lungs  Shortness of breath at rest NO  Shortness of breath with activity YES  Dry cough NO  Coughing up mucous or phlegm NO  Coughing up blood NO  Wheezing when breathing NO    Lymph System  Swollen lymph nodes NO  New lumps or bumps NO  Changes in breasts or discharge NO    Digestive System   Nausea or vomiting NO  Loose or watery stools NO  Hard, dry stools (constipation) NO  Fat or grease in stools NO  Blood in stools NO  Stools are black or bloody NO  Abdominal (belly) pain NO    Urinary Tract   Pain when you urinate (pee) NO  Blood in your urine NO  Urinate (pee) more than normal NO  Irregular periods NO    Muscles and bones   Muscle pain YES  Joint or bone pain YES  Swollen joints YES  Other no    Glands  Increased thirst or urination NO  Diabetes NO  Morning glucose: no  Afternoon glucose: no    Mental Health  Depression YES  Anxiety YES  Other mental health issues: no

## 2022-11-09 NOTE — PROGRESS NOTES
STOP [place]       Name: Rosa Camejo MRN# [id number]   Age: 67 year old YOB: 1955     Stop Bang questionnaire completed with a score of >3 to allow for HST     Have you been told you snore loudly (louder than talking or loud enough to be heard through doors)? NO    Do you often feel tired, fatigued, or sleepy during the daytime? YES    Has anyone observed you stop breathing during your sleep? NO    Do you have or are you being treated for high blood pressure? YES    Is your BMI greater than 35? YES    Is your neck size circumference 16 inches or greater? YES    Are you over 50 years old? YES    Stop Bang Score (# of yes): 5

## 2022-11-12 NOTE — PROGRESS NOTES
Chart review prior to sleep testing.    Patient Summary:  67 year old yo female who is referred for chronic sleep onset and maintenance insomnia.    Patient Active Problem List    Diagnosis Date Noted     Hyperlipidemia LDL goal <100 10/19/2022     Priority: Medium     Overview:   IMO Update 10/11       Impaired fasting glucose 10/19/2022     Priority: Medium     Atrial tachycardia (H) 01/29/2020     Priority: Medium     Positive cardiac stress test 12/31/2019     Priority: Medium     Formatting of this note might be different from the original.  Added automatically from request for surgery [id number]       SVT (supraventricular tachycardia) (H) 12/31/2019     Priority: Medium     Formatting of this note might be different from the original.  Added automatically from request for surgery [id number]       Syncope, unspecified syncope type 10/09/2019     Priority: Medium     Formatting of this note might be different from the original.  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     Priority: Medium     Overview:   IMO Update       Farsightedness 09/17/2012     Priority: Medium     Tear film insufficiency 09/17/2012     Priority: Medium     Overview:   IMO Update       Postmenopausal HRT (hormone replacement therapy) 08/08/2011     Priority: Medium     Knee joint replacement status 06/25/2009     Priority: Medium     Overview:   IMO Update 10/11       Status post bariatric surgery 10/26/2007     Priority: Medium     Chondromalacia of patella 08/15/2006     Priority: Medium     Overview:   IMO Update  "10/11       Pes planus 08/15/2006     Priority: Medium     Overview:   IMO Update 10 2016       Generalized anxiety disorder 08/13/2002     Priority: Medium     Overview:   IMO Update 10/11       Mild recurrent major depression (H) 08/13/2002     Priority: Medium     Esophageal reflux 08/13/2002     Priority: Medium     Overview:   GERD       Essential hypertension 08/13/2002     Priority: Medium     Overview:   IMO Update         Current Outpatient Medications   Medication     albuterol (PROAIR HFA/PROVENTIL HFA/VENTOLIN HFA) 108 (90 Base) MCG/ACT inhaler     amLODIPine (NORVASC) 2.5 MG tablet     aspirin (ASA) 81 MG tablet     clonazePAM (KLONOPIN) 1 MG tablet     cyanocobalamin (VITAMIN B-12) 1000 MCG tablet     DULoxetine (CYMBALTA) 60 MG capsule     FERROUS SULFATE DRIED PO     metFORMIN (GLUCOPHAGE) 500 MG tablet     metoprolol tartrate (LOPRESSOR) 50 MG tablet     omeprazole (PRILOSEC) 40 MG DR capsule     rosuvastatin (CRESTOR) 10 MG tablet     zolpidem ER (AMBIEN CR) 12.5 MG CR tablet     No current facility-administered medications for this visit.       Pertinent PMHx of TIA, GERD, obesity (BMI ~47.2) s/p bariatric surgery, vitamin D deficiency, HLD, impaired glucose tolerance, SVT, HTN, iron deficiency anemia, YAZ, depression.    Hgb 12.7 on 7/27/2019  No ferritin, iron studies seen for review.    STOP-BANG score of 5, with unknown neck circumference.  [place] score of 9.  BMI of Estimated body mass index is 47.18 kg/m  as calculated from the following:    Height as of 10/21/22: 1.651 m (5' 5\").    Weight as of 10/21/22: 128.6 kg (283 lb 8 oz).     Per questionnaire: \"Have hard time falling asleep and staying asleep. I wake up 3-4 times a night.\"    No prior sleep testing.    Caffeine use:  No for 3+ per day.  Yes for within 6 hours of bed.    Tobacco use: No    Sleep pattern:  10pm to 5-7am, total sleep time 4-5 hours.  Time to fall asleep: ~45 minutes.  Awakenings: 3-4 times per night, 60 minutes to " return to sleep, awake for total of 3 hours per night.  Napping.  7 days per week, 1-2 hours per nap.    Sleep Habits:   Do you read in bed? Yes  Do you eat in bed? Yes  Do you watch TV in bed? Yes  Do you work in bed? No  Do you use a phone or computer in bed? Yes    Yes for RLS screen.  No for sleep walking.  Yes for dream enactment behavior.  No for bruxism.    Yes for morning headaches.  Yes for snoring.  No for observed apnea.  No for FHx of MARGARET.    SHx:  , lives with daughter, daughter in law and ex-.  Works at liquor store one day per week.    A/P:    1.)  High likelihood of MARGARET with STOP-BANG score of 5.   - Would appear to be candidate for either home sleep testing or in-lab PSG.   - While BMI > 45, lower overall clinical concern for hypoventilation given normal CO2 results    2.)  Sleep onset and maintenance insomnia  - Appears to be highly multifactorial, identified components of:  # Potential RLS - hx of iron deficiency anemia, no ferritin labs for review.  Will recommend baseline ferritin, more detailed history.  # Psychophysiological insomnia with poor sleep hygiene (read, eat, TV, phone in bed), inadequate stimulus control (unclear behaviors / activities during awakenings), excessive daytime napping (napping 1-2 hours daily).  # Potential untreated MARGARET  - Would start with sleep testing, further history at consult.  Will need counseling / changes in sleep hygiene, stimulus control, elimination of daytime napping.    ---  This note was written with the assistance of the Dragon voice-dictation technology software. The final document, although reviewed, may contain errors. For corrections, please contact the office.    Jakub Urbano MD    Sleep Medicine    [facility] [place], [place]  o Main Office: [phone]    [facility] [place], [place]  o [address]  [place], [address]  o Schedule visits: [phone]  o Main Office: [phone]  o Fax: [phone]

## 2022-11-21 NOTE — PROGRESS NOTES
Assessment & Plan     1. Impaired fasting glucose  A1c 6.1%    2. Hyperlipidemia LDL goal <100  Continue crestor    3. Essential hypertension  Stable     4. Mild recurrent major depression (H)  Stable     5. Morbid obesity (H)  Weight loss encouraged     6. Menopause  - DEXA HIP/PELVIS/SPINE - Future; Future    7. Encounter for screening mammogram for breast cancer  - MA SCREENING DIGITAL BILAT - Future  (s+30); Future    8. Need for vaccination  - Pneumococcal 20 Valent Conjugate (PCV20)  - TDAP VACCINE (Adacel, Boostrix)    9. Primary osteoarthritis of left hip  Referral to Dr Roth  - Orthopedic  Referral; Future    10. Need for hepatitis C screening test  - Hepatitis C Screen Reflex to HCV RNA Quant and Genotype; Future    11. Screen for colon cancer  - Colonoscopy Screening  Referral; Future    12. Visit for screening mammogram  - MA SCREENING DIGITAL BILAT - Future  (s+30); Future      Review of prior external note(s) from - [facility] from [facility] reviewed    Follow-up in December.      Amanda Meehan NP  [facility]    Myrna Lee is a 67 year old, presenting for the following health issues:  Diabetes      HPI     Diabetes Follow-up - impaired fasting glucose.       How often are you checking your blood sugar? Not at all    What concerns do you have today about your diabetes? None     Do you have any of these symptoms? (Select all that apply)  No numbness or tingling in feet.  No redness, sores or blisters on feet.  No complaints of excessive thirst.  No reports of blurry vision.  No significant changes to weight.      BP Readings from Last 2 Encounters:   11/23/22 128/78   10/21/22 124/72     Hemoglobin A1C (%)   Date Value   10/21/2022 6.1 (H)     LDL Cholesterol Calculated (mg/dL)   Date Value   10/21/2022 75       Multiple health maintenance items are due, ordered.      Left hip pain due to arthritis.  [facility] XR report  "reviewed.        Mood stable, taking cymbalta and ambien for sleep.      Recent Labs   Lab Test 10/21/22  1108 07/27/19  1353   A1C 6.1*  --    LDL 75  --    HDL 56  --    TRIG 104  --    ALT 13 14   CR 0.84 0.82   GFRESTIMATED 76 76   GFRESTBLACK  --  88   POTASSIUM 4.3 3.9   TSH 1.16  --       BP Readings from Last 3 Encounters:   11/23/22 128/78   10/21/22 124/72   05/26/17 127/64    Wt Readings from Last 3 Encounters:   11/23/22 127.6 kg (281 lb 6.4 oz)   10/21/22 128.6 kg (283 lb 8 oz)                      Review of Systems   Constitutional, HEENT, cardiovascular, pulmonary, gi and gu systems are negative, except as otherwise noted.      Objective    /78 (BP Location: Right arm, Patient Position: Chair, Cuff Size: Adult Large)   Pulse 60   Temp 97.6  F (36.4  C) (Tympanic)   Resp 16   Ht 1.651 m (5' 5\")   Wt 127.6 kg (281 lb 6.4 oz)   SpO2 99%   BMI 46.83 kg/m    Body mass index is 46.83 kg/m .  Physical Exam   GENERAL: healthy, alert and no distress  RESP: lungs clear to auscultation - no rales, rhonchi or wheezes  CV: regular rate and rhythm, normal S1 S2, no S3 or S4, no murmur, click or rub, no peripheral edema and peripheral pulses strong  MS: no gross musculoskeletal defects noted, no edema  PSYCH: mentation appears normal, affect normal/bright                    "

## 2022-11-23 ENCOUNTER — OFFICE VISIT (OUTPATIENT)
Dept: FAMILY MEDICINE | Facility: OTHER | Age: 67
End: 2022-11-23
Attending: NURSE PRACTITIONER
Payer: MEDICARE

## 2022-11-23 VITALS
TEMPERATURE: 97.6 F | WEIGHT: 281.4 LBS | HEART RATE: 60 BPM | HEIGHT: 65 IN | DIASTOLIC BLOOD PRESSURE: 78 MMHG | RESPIRATION RATE: 16 BRPM | SYSTOLIC BLOOD PRESSURE: 128 MMHG | OXYGEN SATURATION: 99 % | BODY MASS INDEX: 46.88 KG/M2

## 2022-11-23 DIAGNOSIS — Z23 NEED FOR VACCINATION: ICD-10-CM

## 2022-11-23 DIAGNOSIS — E78.5 HYPERLIPIDEMIA LDL GOAL <100: ICD-10-CM

## 2022-11-23 DIAGNOSIS — F33.0 MILD RECURRENT MAJOR DEPRESSION (H): ICD-10-CM

## 2022-11-23 DIAGNOSIS — Z78.0 MENOPAUSE: ICD-10-CM

## 2022-11-23 DIAGNOSIS — Z12.31 VISIT FOR SCREENING MAMMOGRAM: ICD-10-CM

## 2022-11-23 DIAGNOSIS — Z12.11 SCREEN FOR COLON CANCER: ICD-10-CM

## 2022-11-23 DIAGNOSIS — Z12.31 ENCOUNTER FOR SCREENING MAMMOGRAM FOR BREAST CANCER: ICD-10-CM

## 2022-11-23 DIAGNOSIS — Z11.59 NEED FOR HEPATITIS C SCREENING TEST: ICD-10-CM

## 2022-11-23 DIAGNOSIS — R73.01 IMPAIRED FASTING GLUCOSE: Primary | ICD-10-CM

## 2022-11-23 DIAGNOSIS — I10 ESSENTIAL HYPERTENSION: ICD-10-CM

## 2022-11-23 DIAGNOSIS — E66.01 MORBID OBESITY (H): ICD-10-CM

## 2022-11-23 DIAGNOSIS — M16.12 PRIMARY OSTEOARTHRITIS OF LEFT HIP: ICD-10-CM

## 2022-11-23 PROCEDURE — 90677 PCV20 VACCINE IM: CPT

## 2022-11-23 PROCEDURE — G0463 HOSPITAL OUTPT CLINIC VISIT: HCPCS

## 2022-11-23 PROCEDURE — 90472 IMMUNIZATION ADMIN EACH ADD: CPT

## 2022-11-23 PROCEDURE — G0009 ADMIN PNEUMOCOCCAL VACCINE: HCPCS

## 2022-11-23 PROCEDURE — 99214 OFFICE O/P EST MOD 30 MIN: CPT | Performed by: NURSE PRACTITIONER

## 2022-11-23 ASSESSMENT — PATIENT HEALTH QUESTIONNAIRE - PHQ9: SUM OF ALL RESPONSES TO PHQ QUESTIONS 1-9: 4

## 2022-11-23 ASSESSMENT — PAIN SCALES - GENERAL: PAINLEVEL: MODERATE PAIN (4)

## 2022-11-25 DIAGNOSIS — Z12.31 ENCOUNTER FOR SCREENING MAMMOGRAM FOR BREAST CANCER: Primary | ICD-10-CM

## 2022-11-28 ENCOUNTER — HOSPITAL ENCOUNTER (OUTPATIENT)
Dept: BONE DENSITY | Facility: HOSPITAL | Age: 67
Discharge: HOME | End: 2022-11-28
Attending: NURSE PRACTITIONER | Admitting: NURSE PRACTITIONER
Payer: MEDICARE

## 2022-11-28 DIAGNOSIS — Z78.0 MENOPAUSE: ICD-10-CM

## 2022-11-28 DIAGNOSIS — E55.9 VITAMIN D DEFICIENCY: ICD-10-CM

## 2022-11-28 PROCEDURE — 77080 DXA BONE DENSITY AXIAL: CPT

## 2023-01-19 ENCOUNTER — TELEPHONE (OUTPATIENT)
Dept: FAMILY MEDICINE | Facility: OTHER | Age: 68
End: 2023-01-19

## 2023-01-19 DIAGNOSIS — Z12.11 ENCOUNTER FOR SCREENING COLONOSCOPY: Primary | ICD-10-CM

## 2023-01-19 RX ORDER — BISACODYL 5 MG
10 TABLET, DELAYED RELEASE (ENTERIC COATED) ORAL ONCE
Qty: 2 TABLET | Refills: 0 | Status: SHIPPED | OUTPATIENT
Start: 2023-01-19 | End: 2023-01-19

## 2023-01-19 NOTE — TELEPHONE ENCOUNTER
Screening Questions for the Scheduling of Screening Colonoscopies     (If Colonoscopy is diagnostic, Provider should review the chart before scheduling.)    Are you younger than 50 or older than 80?  Non    Do you take aspirin or fish oil?  Yes:  (if yes, tell patient to stop 1 week prior to Colonoscopy)y    Do you take warfarin (Coumadin), clopidogrel (Plavix), apixaban (Eliquis), dabigatram (Pradaxa), rivaroxaban (Xarelto) or any blood thinner? No    Do you use oxygen at home?  No    Do you have kidney disease? No    Are you on dialysis? No    Have you had a stroke or heart attack in the last year? No    Have you had a stent in your heart or any blood vessel in the last year? No    Have you had a transplant of any organ?  No    Have you had a colonoscopy or upper endoscopy (EGD) before?  YES. Date-2015.         Date of scheduled Colonoscopy: 2/24/23    Provider: Dr Flores     Pharmacy [facility] [place]

## 2023-01-19 NOTE — TELEPHONE ENCOUNTER
Patient scheduled screening colonoscopy with Dr. Flores 02/24/23, instruction booklet mailed today.  Patient will need a pre-op for anesthesia.

## 2023-01-22 ENCOUNTER — HEALTH MAINTENANCE LETTER (OUTPATIENT)
Age: 68
End: 2023-01-22

## 2023-02-13 ENCOUNTER — OFFICE VISIT (OUTPATIENT)
Dept: FAMILY MEDICINE | Facility: OTHER | Age: 68
End: 2023-02-13
Attending: NURSE PRACTITIONER
Payer: MEDICARE

## 2023-02-13 VITALS
WEIGHT: 279 LBS | RESPIRATION RATE: 12 BRPM | HEIGHT: 65 IN | TEMPERATURE: 98.2 F | BODY MASS INDEX: 46.48 KG/M2 | OXYGEN SATURATION: 98 % | SYSTOLIC BLOOD PRESSURE: 124 MMHG | HEART RATE: 66 BPM | DIASTOLIC BLOOD PRESSURE: 74 MMHG

## 2023-02-13 DIAGNOSIS — E78.5 HYPERLIPIDEMIA LDL GOAL <100: ICD-10-CM

## 2023-02-13 DIAGNOSIS — I47.19 ATRIAL TACHYCARDIA (H): ICD-10-CM

## 2023-02-13 DIAGNOSIS — I10 ESSENTIAL HYPERTENSION: ICD-10-CM

## 2023-02-13 DIAGNOSIS — E66.01 MORBID OBESITY WITH BMI OF 40.0-44.9, ADULT (H): Primary | ICD-10-CM

## 2023-02-13 DIAGNOSIS — R73.02 IMPAIRED GLUCOSE TOLERANCE: ICD-10-CM

## 2023-02-13 DIAGNOSIS — F41.1 GENERALIZED ANXIETY DISORDER: ICD-10-CM

## 2023-02-13 DIAGNOSIS — D50.8 IRON DEFICIENCY ANEMIA SECONDARY TO INADEQUATE DIETARY IRON INTAKE: ICD-10-CM

## 2023-02-13 DIAGNOSIS — I47.10 SVT (SUPRAVENTRICULAR TACHYCARDIA) (H): ICD-10-CM

## 2023-02-13 DIAGNOSIS — Z01.818 PREOP GENERAL PHYSICAL EXAM: ICD-10-CM

## 2023-02-13 DIAGNOSIS — F33.0 MILD RECURRENT MAJOR DEPRESSION (H): ICD-10-CM

## 2023-02-13 LAB
ANION GAP SERPL CALCULATED.3IONS-SCNC: 12 MMOL/L (ref 7–15)
BUN SERPL-MCNC: 11.1 MG/DL (ref 8–23)
CALCIUM SERPL-MCNC: 8.9 MG/DL (ref 8.8–10.2)
CHLORIDE SERPL-SCNC: 103 MMOL/L (ref 98–107)
CREAT SERPL-MCNC: 0.89 MG/DL (ref 0.51–0.95)
DEPRECATED HCO3 PLAS-SCNC: 27 MMOL/L (ref 22–29)
ERYTHROCYTE [DISTWIDTH] IN BLOOD BY AUTOMATED COUNT: 14.6 % (ref 10–15)
GFR SERPL CREATININE-BSD FRML MDRD: 71 ML/MIN/1.73M2
GLUCOSE SERPL-MCNC: 149 MG/DL (ref 70–99)
HCT VFR BLD AUTO: 41.4 % (ref 35–47)
HGB BLD-MCNC: 12.9 G/DL (ref 11.7–15.7)
MCH RBC QN AUTO: 26.4 PG (ref 26.5–33)
MCHC RBC AUTO-ENTMCNC: 31.2 G/DL (ref 31.5–36.5)
MCV RBC AUTO: 85 FL (ref 78–100)
PLATELET # BLD AUTO: 330 10E3/UL (ref 150–450)
POTASSIUM SERPL-SCNC: 4.2 MMOL/L (ref 3.4–5.3)
RBC # BLD AUTO: 4.89 10E6/UL (ref 3.8–5.2)
SODIUM SERPL-SCNC: 142 MMOL/L (ref 136–145)
WBC # BLD AUTO: 5.5 10E3/UL (ref 4–11)

## 2023-02-13 PROCEDURE — 80048 BASIC METABOLIC PNL TOTAL CA: CPT | Mod: ZL | Performed by: NURSE PRACTITIONER

## 2023-02-13 PROCEDURE — 93010 ELECTROCARDIOGRAM REPORT: CPT | Mod: 77 | Performed by: INTERNAL MEDICINE

## 2023-02-13 PROCEDURE — 99214 OFFICE O/P EST MOD 30 MIN: CPT | Performed by: NURSE PRACTITIONER

## 2023-02-13 PROCEDURE — 36415 COLL VENOUS BLD VENIPUNCTURE: CPT | Mod: ZL | Performed by: NURSE PRACTITIONER

## 2023-02-13 PROCEDURE — 85048 AUTOMATED LEUKOCYTE COUNT: CPT | Mod: ZL | Performed by: NURSE PRACTITIONER

## 2023-02-13 PROCEDURE — 93005 ELECTROCARDIOGRAM TRACING: CPT | Performed by: NURSE PRACTITIONER

## 2023-02-13 PROCEDURE — G0463 HOSPITAL OUTPT CLINIC VISIT: HCPCS | Performed by: NURSE PRACTITIONER

## 2023-02-13 RX ORDER — TRAMADOL HYDROCHLORIDE 50 MG/1
1 TABLET ORAL EVERY 6 HOURS PRN
COMMUNITY
Start: 2022-08-31 | End: 2023-05-18

## 2023-02-13 RX ORDER — SEMAGLUTIDE 1.34 MG/ML
0.25 INJECTION, SOLUTION SUBCUTANEOUS
COMMUNITY
Start: 2022-11-04 | End: 2023-03-09

## 2023-02-13 NOTE — PROGRESS NOTES
[facility]  [address]  [facility] [address]  Phone: [phone]  Primary Provider: Amanda Meehan  Pre-op Performing Provider: NANO PEÑA      PREOPERATIVE EVALUATION:  Today's date: 2/13/2023    Rosa Camejo is a 67 year old female who presents for a preoperative evaluation.    Surgical Information:  Surgery/Procedure: colonoscopy   Surgery Location: [facility]  Surgeon: Dr. Flores  Surgery Date: 2/24/23  Time of Surgery: to be determined  Where patient plans to recover: At home with family  Fax number for surgical facility: Note does not need to be faxed, will be available electronically in Epic.    Type of Anesthesia Anticipated: to be determined    Assessment & Plan     The proposed surgical procedure is considered INTERMEDIATE risk.    Preop general physical exam  - EKG 12-lead complete w/read - (Clinic Performed)  - Basic metabolic panel; Future  - CBC with platelets; Future  - Basic metabolic panel  - CBC with platelets    Morbid obesity with BMI of 40.0-44.9, adult (H)    Hyperlipidemia LDL goal <100  Stable. Normal lipid panel Oct 2022.     Impaired glucose tolerance  A1C up to date. Completed last 6 months.     Essential hypertension  stable    Atrial tachycardia (H)  Noted. Does have loop recorder in situ. No acute symptoms    SVT (supraventricular tachycardia) (H)  Noted. Does have loop recorder in situ    Iron deficiency anemia secondary to inadequate dietary iron intake  Stable. Not currently anemic    Generalized anxiety disorder  stable    Mild recurrent major depression (H)  Stable.          Implanted Device:  Cardiac loop recorder      Risks and Recommendations:  The patient has the following additional risks and recommendations for perioperative complications:   - No identified additional risk factors other than previously addressed    Medication Instructions:  CONTINUE    amLODIPine (NORVASC) 2.5 MG tablet   STOP 1 WEEK      PRIOR TO PROCEDURE   aspirin (ASA) 81 MG tablet   CONTINUE    clonazePAM (KLONOPIN) 1 MG tablet   STOP 1 WEEK PRIOR TO PROCEDURE   cyanocobalamin (VITAMIN B-12) 1000 MCG tablet   CONTINUE  DULoxetine (CYMBALTA) 60 MG capsule   CONTINUE    FERROUS SULFATE DRIED PO   DO NOT TAKE MORNING OF PROCEDURE   metFORMIN (GLUCOPHAGE) 500 MG tablet   CONTINUE  metoprolol tartrate (LOPRESSOR) 50 MG tablet   CONTINUE  omeprazole (PRILOSEC) 40 MG DR capsule   CONTINUE  rosuvastatin (CRESTOR) 10 MG tablet   CONTINUE  zolpidem ER (AMBIEN CR) 12.5 MG CR tablet       RECOMMENDATION:  APPROVAL GIVEN to proceed with proposed procedure, without further diagnostic evaluation.    Ordering of each unique test  Prescription drug management        Subjective     HPI related to upcoming procedure: Rosa is a 66 yo patient of MANN Stoll presenting for pre-operative evaluation for colonoscopy.       Preop Questions 2/13/2023   1. Have you ever had a heart attack or stroke? No   2. Have you ever had surgery on your heart or blood vessels, such as a stent placement, a coronary artery bypass, or surgery on an artery in your head, neck, heart, or legs? YES -  Loop recorder.    3. Do you have chest pain with activity? No   4. Do you have a history of  heart failure? No   5. Do you currently have a cold, bronchitis or symptoms of other infection? No   6. Do you have a cough, shortness of breath, or wheezing? YES -  Just finished with viral illness. 2 negative COVID tests. Cough only last week. No chest pain. No shortness of breath.    7. Do you or anyone in your family have previous history of blood clots? YES - Brother x 2 and one sister DVT.    8. Do you or does anyone in your family have a serious bleeding problem such as prolonged bleeding following surgeries or cuts? No   9. Have you ever had problems with anemia or been told to take iron pills? YES - Dx about 2 years ago.    10. Have you had any abnormal blood loss such as black, tarry  or bloody stools, or abnormal vaginal bleeding? No   11. Have you ever had a blood transfusion? No   12. Are you willing to have a blood transfusion if it is medically needed before, during, or after your surgery? Yes   13. Have you or any of your relatives ever had problems with anesthesia? No   14. Do you have sleep apnea, excessive snoring or daytime drowsiness? No   15. Do you have any artifical heart valves or other implanted medical devices like a pacemaker, defibrillator, or continuous glucose monitor? YES -    15a. What type of device do you have? loop recorder   15b. Name of the clinic that manages your device:  [facility]   16. Do you have artificial joints? YES -    17. Are you allergic to latex? No     Health Care Directive:  Patient does not have a Health Care Directive or Living Will: Discussed advance care planning with patient; however, patient declined at this time.    Preoperative Review of :   reviewed - controlled substances reflected in medication list.      Status of Chronic Conditions:  See problem list for active medical problems.  Problems all longstanding and stable, except as noted/documented.  See ROS for pertinent symptoms related to these conditions.      Review of Systems  Constitutional, neuro, ENT, endocrine, pulmonary, cardiac, gastrointestinal, genitourinary, musculoskeletal, integument and psychiatric systems are negative, except as otherwise noted.    Patient Active Problem List    Diagnosis Date Noted     Hyperlipidemia LDL goal <100 10/19/2022     Priority: Medium     Overview:   IMO Update 10/11       Impaired fasting glucose 10/19/2022     Priority: Medium     Atrial tachycardia (H) 01/29/2020     Priority: Medium     Positive cardiac stress test 12/31/2019     Priority: Medium     Formatting of this note might be different from the original.  Added automatically from request for surgery [id number]       SVT (supraventricular tachycardia) (H) 12/31/2019      Priority: Medium     Formatting of this note might be different from the original.  Added automatically from request for surgery [id number]       Syncope, unspecified syncope type 10/09/2019     Priority: Medium     Formatting of this note might be different from the original.  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     Priority: Medium     Overview:   IMO Update       Farsightedness 09/17/2012     Priority: Medium     Tear film insufficiency 09/17/2012     Priority: Medium     Overview:   IMO Update       Postmenopausal HRT (hormone replacement therapy) 08/08/2011     Priority: Medium     Knee joint replacement status 06/25/2009     Priority: Medium     Overview:   IMO Update 10/11       Status post bariatric surgery 10/26/2007     Priority: Medium     Chondromalacia of patella 08/15/2006     Priority: Medium     Overview:   IMO Update 10/11       Pes planus 08/15/2006     Priority: Medium     Overview:   IMO Update 10 2016       Generalized anxiety disorder 08/13/2002     Priority: Medium     Overview:   IMO Update 10/11       Mild recurrent major depression (H) 08/13/2002     Priority: Medium     Esophageal reflux 08/13/2002     Priority: Medium     Overview:   GERD       Essential hypertension 08/13/2002     Priority: Medium     Overview:   IMO Update        Past Medical History:   Diagnosis Date     Anemia      Chronic osteoarthritis      Depressive disorder      Gastroesophageal reflux disease      Hyperlipidemia      Hypertension      Past Surgical History:   Procedure Laterality  Date     APPENDECTOMY       CHOLECYSTECTOMY       GASTRIC BYPASS       HYSTERECTOMY       ORTHOPEDIC SURGERY Bilateral     knee     Current Outpatient Medications   Medication Sig Dispense Refill     amLODIPine (NORVASC) 2.5 MG tablet Take 2.5 mg by mouth daily       aspirin (ASA) 81 MG tablet Take 81 mg by mouth daily       clonazePAM (KLONOPIN) 1 MG tablet Take 1 mg by mouth 2 times daily as needed       cyanocobalamin (VITAMIN B-12) 1000 MCG tablet Take 1,000 mcg by mouth daily       DULoxetine (CYMBALTA) 60 MG capsule Take 60 mg by mouth At Bedtime       FERROUS SULFATE DRIED PO Take 1 tablet by mouth daily       metoprolol tartrate (LOPRESSOR) 50 MG tablet Take 25 mg by mouth 2 times daily       omeprazole (PRILOSEC) 40 MG DR capsule TAKE 1 CAPSULE BY MOUTH TWO TIMES A DAY BEFORE MEALS. PLEASE SCHEDULE FOLLOW UP CLINIC APPOINTMENT FOR FURTHER REFILLS.       rosuvastatin (CRESTOR) 10 MG tablet Take 10 mg by mouth daily       traMADol (ULTRAM) 50 MG tablet Take 1 tablet by mouth every 6 hours as needed       zolpidem ER (AMBIEN CR) 12.5 MG CR tablet Take 12.5 mg by mouth       metFORMIN (GLUCOPHAGE) 500 MG tablet Take 1 tablet (500 mg) by mouth 2 times daily (with meals) (Patient not taking: Reported on 2/13/2023) 120 tablet 3     OZEMPIC, 0.25 OR 0.5 MG/DOSE, 2 MG/1.5ML SOPN pen Inject 0.25 mg Subcutaneous every 7 days         Allergies   Allergen Reactions     Amoxicillin Hives     Nausea       Diagn[employer] Hives     Erythromycin Other (See Comments)     nausea  Difficulty breathing     Etodolac Hives     Nsaids      Other reaction(s): GI Bleed  Intolorance due to gastric bypass surgery     Pcn [Penicillins] Hives     Meperidine Rash and Other (See Comments)     Gets shakey and disoriented  Tolerated hydrocodone  Has tolerated Morphine        Social History     Tobacco Use     Smoking status: Never     Smokeless tobacco: Never   Substance Use Topics     Alcohol use: Not Currently       History  "  Drug Use Unknown         Objective     /74 (BP Location: Right arm, Patient Position: Sitting, Cuff Size: Adult Regular)   Pulse 66   Temp 98.2  F (36.8  C) (Tympanic)   Resp 12   Ht 1.651 m (5' 5\")   Wt 126.6 kg (279 lb)   SpO2 98%   BMI 46.43 kg/m      Physical Exam    GENERAL APPEARANCE: healthy, alert and no distress     EYES: EOMI, PERRL     HENT: ear canals and TM's normal and nose and mouth without ulcers or lesions     NECK: no adenopathy, no asymmetry, masses, or scars and thyroid normal to palpation     RESP: lungs clear to auscultation - no rales, rhonchi or wheezes     CV: regular rates and rhythm, normal S1 S2, no S3 or S4 and no murmur, click or rub     ABDOMEN:  soft, nontender, no HSM or masses and bowel sounds normal     MS: extremities normal- no gross deformities noted, no evidence of inflammation in joints, FROM in all extremities.     SKIN: no suspicious lesions or rashes     NEURO: Normal strength and tone, sensory exam grossly normal, mentation intact and speech normal     PSYCH: mentation appears normal. and affect normal/bright     LYMPHATICS: No cervical adenopathy    Recent Labs   Lab Test 10/21/22  1108      POTASSIUM 4.3   CR 0.84   A1C 6.1*        Diagnostics:  Results for orders placed or performed in visit on 02/13/23   Basic metabolic panel     Status: Abnormal   Result Value Ref Range    Sodium 142 136 - 145 mmol/L    Potassium 4.2 3.4 - 5.3 mmol/L    Chloride 103 98 - 107 mmol/L    Carbon Dioxide (CO2) 27 22 - 29 mmol/L    Anion Gap 12 7 - 15 mmol/L    Urea Nitrogen 11.1 8.0 - 23.0 mg/dL    Creatinine 0.89 0.51 - 0.95 mg/dL    Calcium 8.9 8.8 - 10.2 mg/dL    Glucose 149 (H) 70 - 99 mg/dL    GFR Estimate 71 >60 mL/min/1.73m2   CBC with platelets     Status: Abnormal   Result Value Ref Range    WBC Count 5.5 4.0 - 11.0 10e3/uL    RBC Count 4.89 3.80 - 5.20 10e6/uL    Hemoglobin 12.9 11.7 - 15.7 g/dL    Hematocrit 41.4 35.0 - 47.0 %    MCV 85 78 - 100 fL    MCH " 26.4 (L) 26.5 - 33.0 pg    MCHC 31.2 (L) 31.5 - 36.5 g/dL    RDW 14.6 10.0 - 15.0 %    Platelet Count 330 150 - 450 10e3/uL        EKG: appears normal, NSR, normal axis, normal intervals, no acute ST/T changes c/w ischemia, no LVH by voltage criteria, unchanged from previous tracings    Revised Cardiac Risk Index (RCRI):  The patient has the following serious cardiovascular risks for perioperative complications:   - No serious cardiac risks = 0 points     RCRI Interpretation: 0 points: Class I (very low risk - 0.4% complication rate)           Signed Electronically by: Edith Drummond CNP  Copy of this evaluation report is provided to requesting physician.

## 2023-02-13 NOTE — PATIENT INSTRUCTIONS
Current Outpatient Medications   Medication   CONTINUE    amLODIPine (NORVASC) 2.5 MG tablet   STOP 1 WEEK     PRIOR TO PROCEDURE   aspirin (ASA) 81 MG tablet   CONTINUE    clonazePAM (KLONOPIN) 1 MG tablet   STOP 1 WEEK PRIOR TO PROCEDURE   cyanocobalamin (VITAMIN B-12) 1000 MCG tablet   CONTINUE  DULoxetine (CYMBALTA) 60 MG capsule   CONTINUE    FERROUS SULFATE DRIED PO   DO NOT TAKE MORNING OF PROCEDURE   metFORMIN (GLUCOPHAGE) 500 MG tablet   CONTINUE  metoprolol tartrate (LOPRESSOR) 50 MG tablet   CONTINUE  omeprazole (PRILOSEC) 40 MG DR capsule   CONTINUE  rosuvastatin (CRESTOR) 10 MG tablet   CONTINUE  zolpidem ER (AMBIEN CR) 12.5 MG CR tablet     FOLLOW YOUR SURGICAL TEAM'S RECOMMENDATIONS FOR ANY PREPARATION FOR COLONOSCOPY.

## 2023-02-13 NOTE — H&P (VIEW-ONLY)
[facility]  [address]  [facility] [address]  Phone: [phone]  Primary Provider: Amanda Meehan  Pre-op Performing Provider: NANO PEÑA      PREOPERATIVE EVALUATION:  Today's date: 2/13/2023    Rosa Camejo is a 67 year old female who presents for a preoperative evaluation.    Surgical Information:  Surgery/Procedure: colonoscopy   Surgery Location: [facility]  Surgeon: Dr. Flores  Surgery Date: 2/24/23  Time of Surgery: to be determined  Where patient plans to recover: At home with family  Fax number for surgical facility: Note does not need to be faxed, will be available electronically in Epic.    Type of Anesthesia Anticipated: to be determined    Assessment & Plan     The proposed surgical procedure is considered INTERMEDIATE risk.    Preop general physical exam  - EKG 12-lead complete w/read - (Clinic Performed)  - Basic metabolic panel; Future  - CBC with platelets; Future  - Basic metabolic panel  - CBC with platelets    Morbid obesity with BMI of 40.0-44.9, adult (H)    Hyperlipidemia LDL goal <100  Stable. Normal lipid panel Oct 2022.     Impaired glucose tolerance  A1C up to date. Completed last 6 months.     Essential hypertension  stable    Atrial tachycardia (H)  Noted. Does have loop recorder in situ. No acute symptoms    SVT (supraventricular tachycardia) (H)  Noted. Does have loop recorder in situ    Iron deficiency anemia secondary to inadequate dietary iron intake  Stable. Not currently anemic    Generalized anxiety disorder  stable    Mild recurrent major depression (H)  Stable.          Implanted Device:  Cardiac loop recorder      Risks and Recommendations:  The patient has the following additional risks and recommendations for perioperative complications:   - No identified additional risk factors other than previously addressed    Medication Instructions:  CONTINUE    amLODIPine (NORVASC) 2.5 MG tablet   STOP 1 WEEK      PRIOR TO PROCEDURE   aspirin (ASA) 81 MG tablet   CONTINUE    clonazePAM (KLONOPIN) 1 MG tablet   STOP 1 WEEK PRIOR TO PROCEDURE   cyanocobalamin (VITAMIN B-12) 1000 MCG tablet   CONTINUE  DULoxetine (CYMBALTA) 60 MG capsule   CONTINUE    FERROUS SULFATE DRIED PO   DO NOT TAKE MORNING OF PROCEDURE   metFORMIN (GLUCOPHAGE) 500 MG tablet   CONTINUE  metoprolol tartrate (LOPRESSOR) 50 MG tablet   CONTINUE  omeprazole (PRILOSEC) 40 MG DR capsule   CONTINUE  rosuvastatin (CRESTOR) 10 MG tablet   CONTINUE  zolpidem ER (AMBIEN CR) 12.5 MG CR tablet       RECOMMENDATION:  APPROVAL GIVEN to proceed with proposed procedure, without further diagnostic evaluation.    Ordering of each unique test  Prescription drug management        Subjective     HPI related to upcoming procedure: Rosa is a 66 yo patient of MANN Stoll presenting for pre-operative evaluation for colonoscopy.       Preop Questions 2/13/2023   1. Have you ever had a heart attack or stroke? No   2. Have you ever had surgery on your heart or blood vessels, such as a stent placement, a coronary artery bypass, or surgery on an artery in your head, neck, heart, or legs? YES -  Loop recorder.    3. Do you have chest pain with activity? No   4. Do you have a history of  heart failure? No   5. Do you currently have a cold, bronchitis or symptoms of other infection? No   6. Do you have a cough, shortness of breath, or wheezing? YES -  Just finished with viral illness. 2 negative COVID tests. Cough only last week. No chest pain. No shortness of breath.    7. Do you or anyone in your family have previous history of blood clots? YES - Brother x 2 and one sister DVT.    8. Do you or does anyone in your family have a serious bleeding problem such as prolonged bleeding following surgeries or cuts? No   9. Have you ever had problems with anemia or been told to take iron pills? YES - Dx about 2 years ago.    10. Have you had any abnormal blood loss such as black, tarry  or bloody stools, or abnormal vaginal bleeding? No   11. Have you ever had a blood transfusion? No   12. Are you willing to have a blood transfusion if it is medically needed before, during, or after your surgery? Yes   13. Have you or any of your relatives ever had problems with anesthesia? No   14. Do you have sleep apnea, excessive snoring or daytime drowsiness? No   15. Do you have any artifical heart valves or other implanted medical devices like a pacemaker, defibrillator, or continuous glucose monitor? YES -    15a. What type of device do you have? loop recorder   15b. Name of the clinic that manages your device:  [facility] [place]   16. Do you have artificial joints? YES -    17. Are you allergic to latex? No     Health Care Directive:  Patient does not have a Health Care Directive or Living Will: Discussed advance care planning with patient; however, patient declined at this time.    Preoperative Review of :   reviewed - controlled substances reflected in medication list.      Status of Chronic Conditions:  See problem list for active medical problems.  Problems all longstanding and stable, except as noted/documented.  See ROS for pertinent symptoms related to these conditions.      Review of Systems  Constitutional, neuro, ENT, endocrine, pulmonary, cardiac, gastrointestinal, genitourinary, musculoskeletal, integument and psychiatric systems are negative, except as otherwise noted.    Patient Active Problem List    Diagnosis Date Noted     Hyperlipidemia LDL goal <100 10/19/2022     Priority: Medium     Overview:   IMO Update 10/11       Impaired fasting glucose 10/19/2022     Priority: Medium     Atrial tachycardia (H) 01/29/2020     Priority: Medium     Positive cardiac stress test 12/31/2019     Priority: Medium     Formatting of this note might be different from the original.  Added automatically from request for surgery [id number]       SVT (supraventricular tachycardia) (H) 12/31/2019      Priority: Medium     Formatting of this note might be different from the original.  Added automatically from request for surgery [id number]       Syncope, unspecified syncope type 10/09/2019     Priority: Medium     Formatting of this note might be different from the original.  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     Priority: Medium     Overview:   IMO Update       Farsightedness 09/17/2012     Priority: Medium     Tear film insufficiency 09/17/2012     Priority: Medium     Overview:   IMO Update       Postmenopausal HRT (hormone replacement therapy) 08/08/2011     Priority: Medium     Knee joint replacement status 06/25/2009     Priority: Medium     Overview:   IMO Update 10/11       Status post bariatric surgery 10/26/2007     Priority: Medium     Chondromalacia of patella 08/15/2006     Priority: Medium     Overview:   IMO Update 10/11       Pes planus 08/15/2006     Priority: Medium     Overview:   IMO Update 10 2016       Generalized anxiety disorder 08/13/2002     Priority: Medium     Overview:   IMO Update 10/11       Mild recurrent major depression (H) 08/13/2002     Priority: Medium     Esophageal reflux 08/13/2002     Priority: Medium     Overview:   GERD       Essential hypertension 08/13/2002     Priority: Medium     Overview:   IMO Update        Past Medical History:   Diagnosis Date     Anemia      Chronic osteoarthritis      Depressive disorder      Gastroesophageal reflux disease      Hyperlipidemia      Hypertension      Past Surgical History:   Procedure Laterality  Date     APPENDECTOMY       CHOLECYSTECTOMY       GASTRIC BYPASS       HYSTERECTOMY       ORTHOPEDIC SURGERY Bilateral     knee     Current Outpatient Medications   Medication Sig Dispense Refill     amLODIPine (NORVASC) 2.5 MG tablet Take 2.5 mg by mouth daily       aspirin (ASA) 81 MG tablet Take 81 mg by mouth daily       clonazePAM (KLONOPIN) 1 MG tablet Take 1 mg by mouth 2 times daily as needed       cyanocobalamin (VITAMIN B-12) 1000 MCG tablet Take 1,000 mcg by mouth daily       DULoxetine (CYMBALTA) 60 MG capsule Take 60 mg by mouth At Bedtime       FERROUS SULFATE DRIED PO Take 1 tablet by mouth daily       metoprolol tartrate (LOPRESSOR) 50 MG tablet Take 25 mg by mouth 2 times daily       omeprazole (PRILOSEC) 40 MG DR capsule TAKE 1 CAPSULE BY MOUTH TWO TIMES A DAY BEFORE MEALS. PLEASE SCHEDULE FOLLOW UP CLINIC APPOINTMENT FOR FURTHER REFILLS.       rosuvastatin (CRESTOR) 10 MG tablet Take 10 mg by mouth daily       traMADol (ULTRAM) 50 MG tablet Take 1 tablet by mouth every 6 hours as needed       zolpidem ER (AMBIEN CR) 12.5 MG CR tablet Take 12.5 mg by mouth       metFORMIN (GLUCOPHAGE) 500 MG tablet Take 1 tablet (500 mg) by mouth 2 times daily (with meals) (Patient not taking: Reported on 2/13/2023) 120 tablet 3     OZEMPIC, 0.25 OR 0.5 MG/DOSE, 2 MG/1.5ML SOPN pen Inject 0.25 mg Subcutaneous every 7 days         Allergies   Allergen Reactions     Amoxicillin Hives     Nausea       Diagnostic X-Ray Materials Hives     Erythromycin Other (See Comments)     nausea  Difficulty breathing     Etodolac Hives     Nsaids      Other reaction(s): GI Bleed  Intolorance due to gastric bypass surgery     Pcn [Penicillins] Hives     Meperidine Rash and Other (See Comments)     Gets shakey and disoriented  Tolerated hydrocodone  Has tolerated Morphine        Social History     Tobacco Use     Smoking status: Never     Smokeless tobacco: Never   Substance Use Topics     Alcohol use: Not Currently       History  "  Drug Use Unknown         Objective     /74 (BP Location: Right arm, Patient Position: Sitting, Cuff Size: Adult Regular)   Pulse 66   Temp 98.2  F (36.8  C) (Tympanic)   Resp 12   Ht 1.651 m (5' 5\")   Wt 126.6 kg (279 lb)   SpO2 98%   BMI 46.43 kg/m      Physical Exam    GENERAL APPEARANCE: healthy, alert and no distress     EYES: EOMI, PERRL     HENT: ear canals and TM's normal and nose and mouth without ulcers or lesions     NECK: no adenopathy, no asymmetry, masses, or scars and thyroid normal to palpation     RESP: lungs clear to auscultation - no rales, rhonchi or wheezes     CV: regular rates and rhythm, normal S1 S2, no S3 or S4 and no murmur, click or rub     ABDOMEN:  soft, nontender, no HSM or masses and bowel sounds normal     MS: extremities normal- no gross deformities noted, no evidence of inflammation in joints, FROM in all extremities.     SKIN: no suspicious lesions or rashes     NEURO: Normal strength and tone, sensory exam grossly normal, mentation intact and speech normal     PSYCH: mentation appears normal. and affect normal/bright     LYMPHATICS: No cervical adenopathy    Recent Labs   Lab Test 10/21/22  1108      POTASSIUM 4.3   CR 0.84   A1C 6.1*        Diagnostics:  Results for orders placed or performed in visit on 02/13/23   Basic metabolic panel     Status: Abnormal   Result Value Ref Range    Sodium 142 136 - 145 mmol/L    Potassium 4.2 3.4 - 5.3 mmol/L    Chloride 103 98 - 107 mmol/L    Carbon Dioxide (CO2) 27 22 - 29 mmol/L    Anion Gap 12 7 - 15 mmol/L    Urea Nitrogen 11.1 8.0 - 23.0 mg/dL    Creatinine 0.89 0.51 - 0.95 mg/dL    Calcium 8.9 8.8 - 10.2 mg/dL    Glucose 149 (H) 70 - 99 mg/dL    GFR Estimate 71 >60 mL/min/1.73m2   CBC with platelets     Status: Abnormal   Result Value Ref Range    WBC Count 5.5 4.0 - 11.0 10e3/uL    RBC Count 4.89 3.80 - 5.20 10e6/uL    Hemoglobin 12.9 11.7 - 15.7 g/dL    Hematocrit 41.4 35.0 - 47.0 %    MCV 85 78 - 100 fL    MCH " 26.4 (L) 26.5 - 33.0 pg    MCHC 31.2 (L) 31.5 - 36.5 g/dL    RDW 14.6 10.0 - 15.0 %    Platelet Count 330 150 - 450 10e3/uL        EKG: appears normal, NSR, normal axis, normal intervals, no acute ST/T changes c/w ischemia, no LVH by voltage criteria, unchanged from previous tracings    Revised Cardiac Risk Index (RCRI):  The patient has the following serious cardiovascular risks for perioperative complications:   - No serious cardiac risks = 0 points     RCRI Interpretation: 0 points: Class I (very low risk - 0.4% complication rate)           Signed Electronically by: Edith Drummond CNP  Copy of this evaluation report is provided to requesting physician.

## 2023-02-16 ENCOUNTER — ANESTHESIA EVENT (OUTPATIENT)
Dept: SURGERY | Facility: HOSPITAL | Age: 68
End: 2023-02-16
Payer: MEDICARE

## 2023-02-16 ASSESSMENT — ENCOUNTER SYMPTOMS: DYSRHYTHMIAS: 1

## 2023-02-16 NOTE — ANESTHESIA PREPROCEDURE EVALUATION
Anesthesia Pre-Procedure Evaluation    Patient: Rosa Camejo   MRN: [id number] : 1955        Procedure : Procedure(s):  COLONOSCOPY          Past Medical History:   Diagnosis Date     Anemia      Chronic osteoarthritis      Depressive disorder      Gastroesophageal reflux disease      Hyperlipidemia      Hypertension       Past Surgical History:   Procedure Laterality Date     APPENDECTOMY       CHOLECYSTECTOMY       GASTRIC BYPASS       HYSTERECTOMY       ORTHOPEDIC SURGERY Bilateral     knee      Allergies   Allergen Reactions     Amoxicillin Hives     Nausea       Diagnostic X-Ray Materials Hives     Erythromycin Other (See Comments)     nausea  Difficulty breathing     Etodolac Hives     Nsaids      Other reaction(s): GI Bleed  Intolorance due to gastric bypass surgery     Pcn [Penicillins] Hives     Meperidine Rash and Other (See Comments)     Gets shakey and disoriented  Tolerated hydrocodone  Has tolerated Morphine      Social History     Tobacco Use     Smoking status: Never     Smokeless tobacco: Never   Substance Use Topics     Alcohol use: Not Currently      Wt Readings from Last 1 Encounters:   23 126.6 kg (279 lb)        Anesthesia Evaluation   Pt has had prior anesthetic. Type: General.    No history of anesthetic complications       ROS/MED HX  ENT/Pulmonary:     (+) MARGARET risk factors, snores loudly, hypertension, obese, recent URI, resolved, symptom free for 2 weeks:     Neurologic:     (+) migraines,     Cardiovascular: Comment: SVT  Has loop recorder in     (+) Dyslipidemia hypertension-range: pt states 130/70 is normal/ ----fainting (syncope). dysrhythmias, Other, Irregular Heartbeat/Palpitations, Previous cardiac testing   Echo: Date:  Results:  EF 65%  Stress Test: Date: 10/2/2019 Results:  This is a Lexiscan Cardiolite study on patient Lala Camejo ordered by  Dr. Hinton and Dr. Manuel for syncope and PSVT.     The patient was placed upon the table using our protocol and  given 0.4  mg of IV Lexiscan bolus. She was monitored continuously throughout the  study. Resting heart was 67 heriberto to 93. Resting blood pressure 130/82  went to 124/72. She had transient shortness of breath.     Review of the electrocardiogram shows baseline nonspecific ST-T  changes present throughout the entire study. There were no  arrhythmias.     ASSESSMENT: Lexiscan Cardiolite study which the patient had  appropriate heart rate and blood pressure responses. No acute EKG  changes. No arrhythmias. The Cardiolite scans are pending.  ECG Reviewed: Date: 2/23 Results:  NSR  Cath:  Date: Results:      METS/Exercise Tolerance: >4 METS    Hematologic:     (+) anemia,     Musculoskeletal:   (+) arthritis,     GI/Hepatic: Comment: S/p gastric bypass    (+) GERD, Asymptomatic on medication, bowel prep,     Renal/Genitourinary:  - neg Renal ROS     Endo: Comment: Prediabetes    (+) Obesity,     Psychiatric/Substance Use:     (+) psychiatric history depression and anxiety (klonopin and ambien daily)     Infectious Disease:  - neg infectious disease ROS     Malignancy:  - neg malignancy ROS     Other:  - neg other ROS          Physical Exam    Airway        Mallampati: III   TM distance: > 3 FB   Neck ROM: full   Mouth opening: > 3 cm    Respiratory Devices and Support         Dental       (+) Edentulous      Cardiovascular   cardiovascular exam normal       Rhythm and rate: regular and normal     Pulmonary   pulmonary exam normal        breath sounds clear to auscultation       Other findings: Upper dentures, pt sleeps with them in    OUTSIDE LABS:  CBC:   Lab Results   Component Value Date    WBC 5.5 02/13/2023    WBC 6.0 07/27/2019    HGB 12.9 02/13/2023    HGB 12.7 07/27/2019    HCT 41.4 02/13/2023    HCT 40.2 07/27/2019     02/13/2023     07/27/2019     BMP:   Lab Results   Component Value Date     02/13/2023     10/21/2022    POTASSIUM 4.2 02/13/2023    POTASSIUM 4.3 10/21/2022    CHLORIDE  103 02/13/2023    CHLORIDE 102 10/21/2022    CO2 27 02/13/2023    CO2 27 10/21/2022    BUN 11.1 02/13/2023    BUN 15.8 10/21/2022    CR 0.89 02/13/2023    CR 0.84 10/21/2022     (H) 02/13/2023    GLC 98 10/21/2022     COAGS: No results found for: PTT, INR, FIBR  POC:   Lab Results   Component Value Date    BGM 96 07/27/2019     HEPATIC:   Lab Results   Component Value Date    ALBUMIN 4.2 10/21/2022    PROTTOTAL 7.3 10/21/2022    ALT 13 10/21/2022    AST 18 10/21/2022    ALKPHOS 90 10/21/2022    BILITOTAL 1.1 10/21/2022     OTHER:   Lab Results   Component Value Date    LACT 0.8 07/27/2019    A1C 6.1 (H) 10/21/2022    IVETT 8.9 02/13/2023    TSH 1.16 10/21/2022       Anesthesia Plan    ASA Status:  3   NPO Status:  NPO Appropriate (finished prep 0500)    Anesthesia Type: MAC.     - Reason for MAC: straight local not clinically adequate   Induction: Intravenous, Propofol.   Maintenance: Balanced.        Consents    Anesthesia Plan(s) and associated risks, benefits, and realistic alternatives discussed. Questions answered and patient/representative(s) expressed understanding.     - Discussed: Risks, Benefits and Alternatives for BOTH SEDATION and the PROCEDURE were discussed     - Discussed with:  Patient      - Extended Intubation/Ventilatory Support Discussed: No.      - Patient is DNR/DNI Status: No    Use of blood products discussed: No .     Postoperative Care            Comments:    Other Comments:  2/13/23, no interval changes  Patient has loop recorder in             MANN Mccormack CRNA

## 2023-02-24 ENCOUNTER — ANESTHESIA (OUTPATIENT)
Dept: SURGERY | Facility: HOSPITAL | Age: 68
End: 2023-02-24
Payer: MEDICARE

## 2023-02-24 ENCOUNTER — HOSPITAL ENCOUNTER (OUTPATIENT)
Facility: HOSPITAL | Age: 68
Discharge: HOME | End: 2023-02-24
Attending: SURGERY | Admitting: SURGERY
Payer: MEDICARE

## 2023-02-24 VITALS
HEART RATE: 61 BPM | BODY MASS INDEX: 46.78 KG/M2 | WEIGHT: 274 LBS | TEMPERATURE: 97.6 F | HEIGHT: 64 IN | OXYGEN SATURATION: 97 % | RESPIRATION RATE: 20 BRPM | DIASTOLIC BLOOD PRESSURE: 53 MMHG | SYSTOLIC BLOOD PRESSURE: 111 MMHG

## 2023-02-24 LAB — GLUCOSE BLDC GLUCOMTR-MCNC: 115 MG/DL (ref 70–99)

## 2023-02-24 PROCEDURE — 999N000141 HC STATISTIC PRE-PROCEDURE NURSING ASSESSMENT: Performed by: SURGERY

## 2023-02-24 PROCEDURE — 45380 COLONOSCOPY AND BIOPSY: CPT | Performed by: NURSE ANESTHETIST, CERTIFIED REGISTERED

## 2023-02-24 PROCEDURE — 370N000017 HC ANESTHESIA TECHNICAL FEE, PER MIN: Performed by: SURGERY

## 2023-02-24 PROCEDURE — 258N000003 HC RX IP 258 OP 636: Performed by: SURGERY

## 2023-02-24 PROCEDURE — 710N000012 HC RECOVERY PHASE 2, PER MINUTE: Performed by: SURGERY

## 2023-02-24 PROCEDURE — 272N000001 HC OR GENERAL SUPPLY STERILE: Performed by: SURGERY

## 2023-02-24 PROCEDURE — 360N000075 HC SURGERY LEVEL 2, PER MIN: Performed by: SURGERY

## 2023-02-24 PROCEDURE — 250N000009 HC RX 250: Performed by: NURSE ANESTHETIST, CERTIFIED REGISTERED

## 2023-02-24 PROCEDURE — 45380 COLONOSCOPY AND BIOPSY: CPT | Mod: PT | Performed by: SURGERY

## 2023-02-24 PROCEDURE — 88305 TISSUE EXAM BY PATHOLOGIST: CPT | Mod: 26 | Performed by: PATHOLOGY

## 2023-02-24 PROCEDURE — 250N000011 HC RX IP 250 OP 636: Performed by: NURSE ANESTHETIST, CERTIFIED REGISTERED

## 2023-02-24 PROCEDURE — 88305 TISSUE EXAM BY PATHOLOGIST: CPT | Mod: TC | Performed by: SURGERY

## 2023-02-24 PROCEDURE — 82962 GLUCOSE BLOOD TEST: CPT

## 2023-02-24 RX ORDER — PROPOFOL 10 MG/ML
INJECTION, EMULSION INTRAVENOUS PRN
Status: DISCONTINUED | OUTPATIENT
Start: 2023-02-24 | End: 2023-02-24

## 2023-02-24 RX ORDER — NALOXONE HYDROCHLORIDE 0.4 MG/ML
0.2 INJECTION, SOLUTION INTRAMUSCULAR; INTRAVENOUS; SUBCUTANEOUS
Status: DISCONTINUED | OUTPATIENT
Start: 2023-02-24 | End: 2023-02-24 | Stop reason: HOSPADM

## 2023-02-24 RX ORDER — FLUMAZENIL 0.1 MG/ML
0.2 INJECTION, SOLUTION INTRAVENOUS
Status: DISCONTINUED | OUTPATIENT
Start: 2023-02-24 | End: 2023-02-24 | Stop reason: HOSPADM

## 2023-02-24 RX ORDER — SODIUM CHLORIDE, SODIUM LACTATE, POTASSIUM CHLORIDE, CALCIUM CHLORIDE 600; 310; 30; 20 MG/100ML; MG/100ML; MG/100ML; MG/100ML
INJECTION, SOLUTION INTRAVENOUS CONTINUOUS
Status: DISCONTINUED | OUTPATIENT
Start: 2023-02-24 | End: 2023-02-24 | Stop reason: HOSPADM

## 2023-02-24 RX ORDER — NALOXONE HYDROCHLORIDE 0.4 MG/ML
0.4 INJECTION, SOLUTION INTRAMUSCULAR; INTRAVENOUS; SUBCUTANEOUS
Status: DISCONTINUED | OUTPATIENT
Start: 2023-02-24 | End: 2023-02-24 | Stop reason: HOSPADM

## 2023-02-24 RX ORDER — LIDOCAINE 40 MG/G
CREAM TOPICAL
Status: DISCONTINUED | OUTPATIENT
Start: 2023-02-24 | End: 2023-02-24 | Stop reason: HOSPADM

## 2023-02-24 RX ORDER — LIDOCAINE HYDROCHLORIDE 20 MG/ML
INJECTION, SOLUTION INFILTRATION; PERINEURAL PRN
Status: DISCONTINUED | OUTPATIENT
Start: 2023-02-24 | End: 2023-02-24

## 2023-02-24 RX ADMIN — PROPOFOL 50 MG: 10 INJECTION, EMULSION INTRAVENOUS at 12:02

## 2023-02-24 RX ADMIN — PROPOFOL 50 MG: 10 INJECTION, EMULSION INTRAVENOUS at 12:11

## 2023-02-24 RX ADMIN — PROPOFOL 80 MG: 10 INJECTION, EMULSION INTRAVENOUS at 11:55

## 2023-02-24 RX ADMIN — LIDOCAINE HYDROCHLORIDE 40 MG: 20 INJECTION, SOLUTION INFILTRATION; PERINEURAL at 11:55

## 2023-02-24 RX ADMIN — PROPOFOL 50 MG: 10 INJECTION, EMULSION INTRAVENOUS at 12:07

## 2023-02-24 RX ADMIN — PROPOFOL 50 MG: 10 INJECTION, EMULSION INTRAVENOUS at 11:58

## 2023-02-24 RX ADMIN — SODIUM CHLORIDE, POTASSIUM CHLORIDE, SODIUM LACTATE AND CALCIUM CHLORIDE: 600; 310; 30; 20 INJECTION, SOLUTION INTRAVENOUS at 10:37

## 2023-02-24 ASSESSMENT — ACTIVITIES OF DAILY LIVING (ADL)
ADLS_ACUITY_SCORE: 35
ADLS_ACUITY_SCORE: 35
ADLS_ACUITY_SCORE: 33

## 2023-02-24 NOTE — OR NURSING
Patient and responsible adult given discharge instructions with no questions regarding instructions. Sheridan score 18. Pain level 0/10.  Discharged from unit via ambulation. Patient discharged to home.

## 2023-02-24 NOTE — INTERVAL H&P NOTE
"I have reviewed the surgical (or preoperative) H&P that is linked to this encounter, and examined the patient. There are no significant changes    Clinical Conditions Present on Arrival:  Clinically Significant Risk Factors Present on Admission                    # Severe Obesity: Estimated body mass index is 47.03 kg/m  as calculated from the following:    Height as of this encounter: 1.626 m (5' 4\").    Weight as of this encounter: 124.3 kg (274 lb).       "

## 2023-02-24 NOTE — ANESTHESIA POSTPROCEDURE EVALUATION
Patient: Rosa Camejo    Procedure: Procedure(s):  COLONOSCOPY, WITH POLYPECTOMY AND BIOPSY       Anesthesia Type:  MAC    Note:  Disposition: Outpatient   Postop Pain Control: Uneventful            Sign Out: Well controlled pain   PONV: No   Neuro/Psych: Uneventful            Sign Out: Acceptable/Baseline neuro status   Airway/Respiratory: Uneventful            Sign Out: Acceptable/Baseline resp. status   CV/Hemodynamics: Uneventful            Sign Out: Acceptable CV status; No obvious hypovolemia; No obvious fluid overload   Other NRE: NONE   DID A NON-ROUTINE EVENT OCCUR? No           Last vitals:  Vitals Value Taken Time   /60 02/24/23 1220   Temp 97.6  F (36.4  C) 02/24/23 1220   Pulse 62 02/24/23 1220   Resp 20 02/24/23 1220   SpO2 98 % 02/24/23 1228   Vitals shown include unvalidated device data.    Electronically Signed By: MANN Dominguez CRNA  February 24, 2023  12:29 PM

## 2023-02-24 NOTE — ANESTHESIA CARE TRANSFER NOTE
Patient: Rosa Camejo    Procedure: Procedure(s):  COLONOSCOPY, WITH POLYPECTOMY AND BIOPSY       Diagnosis: Encounter for screening colonoscopy [Z12.11]  Diagnosis Additional Information: No value filed.    Anesthesia Type:   MAC     Note:    Oropharynx: spontaneously breathing  Level of Consciousness: drowsy  Oxygen Supplementation: room air    Independent Airway: airway patency satisfactory and stable  Dentition: dentition unchanged  Vital Signs Stable: post-procedure vital signs reviewed and stable  Report to RN Given: handoff report given  Patient transferred to: Phase II    Handoff Report: Identifed the Patient, Identified the Reponsible Provider, Reviewed the pertinent medical history, Discussed the surgical course, Reviewed Intra-OP anesthesia mangement and issues during anesthesia, Set expectations for post-procedure period and Allowed opportunity for questions and acknowledgement of understanding      Vitals:  Vitals Value Taken Time   BP     Temp     Pulse     Resp     SpO2         Electronically Signed By: MANN Dominguez CRNA  February 24, 2023  12:16 PM

## 2023-02-24 NOTE — OP NOTE
Rosa Camejo MRN# [id number]   YOB: 1955 Age: 67 year old      Date of Admission:  2/24/2023  Date of Service:   2/24/23    Primary care provider: Amanda Meehan    PREOPERATIVE DIAGNOSIS:  Colon Cancer Screening        POSTOPERATIVE DIAGNOSIS:  Colon polyp x 1          PROCEDURE:  Colonoscopy with polypectomy          INDICATIONS:  Screening colonoscopy.      Specimen:   ID Type Source Tests Collected by Time Destination   1 : 20 cm Polyp Large Intestine, Colon SURGICAL PATHOLOGY EXAM Mg Flores MD 2/24/2023 12:13 PM        SURGEON: Mg Flores MD    DESCRIPTION OF PROCEDURE: Rosa Camejo was brought into the endoscopy suite and placed in the left lateral decubitus position. After preprocedural pause and attended monitored anesthesia was administered, the external anus was inspected and was normal. Digital rectal exam was normal. The colonoscope was inserted and advanced under direct visualization to the level of the cecum which was identified by the appendiceal orifice and the ileocecal valve. The prep was excellent.. Upon slow withdrawal of the colonoscope, approximately 95% of the mucosa was directly visualized. There was one small polyp at 20 cm removed with biopsy forceps.  The rest of the colon was without mucosal abnormality. There was no evidence of further polyps, inflammation, bleeding or AVMs. Retroflexion of the rectum was normal. The extra air was removed from the colon, and the colonoscope withdrawn. The patient tolerated the procedure well and was taken to postanesthesia care unit.     We invite the patient to return in 5-10 years for follow up screening evaluation, pending pathology related to polyp.    Mg Flores MD

## 2023-02-24 NOTE — DISCHARGE INSTRUCTIONS
You had one colon polyp removed today.  Dr. Flores's office will call you with the pathology results when they become available.

## 2023-02-28 LAB
PATH REPORT.COMMENTS IMP SPEC: NORMAL
PATH REPORT.FINAL DX SPEC: NORMAL
PATH REPORT.GROSS SPEC: NORMAL
PATH REPORT.MICROSCOPIC SPEC OTHER STN: NORMAL
PATH REPORT.RELEVANT HX SPEC: NORMAL
PHOTO IMAGE: NORMAL

## 2023-03-09 ENCOUNTER — OFFICE VISIT (OUTPATIENT)
Dept: FAMILY MEDICINE | Facility: OTHER | Age: 68
End: 2023-03-09
Attending: NURSE PRACTITIONER
Payer: MEDICARE

## 2023-03-09 VITALS
BODY MASS INDEX: 47.77 KG/M2 | DIASTOLIC BLOOD PRESSURE: 66 MMHG | HEIGHT: 64 IN | TEMPERATURE: 97 F | OXYGEN SATURATION: 98 % | HEART RATE: 63 BPM | WEIGHT: 279.8 LBS | SYSTOLIC BLOOD PRESSURE: 118 MMHG | RESPIRATION RATE: 16 BRPM

## 2023-03-09 DIAGNOSIS — R73.03 PRE-DIABETES: ICD-10-CM

## 2023-03-09 DIAGNOSIS — Z79.899 ON STATIN THERAPY: ICD-10-CM

## 2023-03-09 DIAGNOSIS — F33.0 MILD RECURRENT MAJOR DEPRESSION (H): ICD-10-CM

## 2023-03-09 DIAGNOSIS — E78.5 HYPERLIPIDEMIA LDL GOAL <100: ICD-10-CM

## 2023-03-09 DIAGNOSIS — E66.01 MORBID OBESITY WITH BMI OF 40.0-44.9, ADULT (H): ICD-10-CM

## 2023-03-09 DIAGNOSIS — Z00.00 ENCOUNTER FOR MEDICARE ANNUAL WELLNESS EXAM: Primary | ICD-10-CM

## 2023-03-09 DIAGNOSIS — I10 ESSENTIAL HYPERTENSION: ICD-10-CM

## 2023-03-09 DIAGNOSIS — F41.1 GENERALIZED ANXIETY DISORDER: ICD-10-CM

## 2023-03-09 LAB
ALBUMIN SERPL BCG-MCNC: 3.8 G/DL (ref 3.5–5.2)
ALP SERPL-CCNC: 104 U/L (ref 35–104)
ALT SERPL W P-5'-P-CCNC: 11 U/L (ref 10–35)
ANION GAP SERPL CALCULATED.3IONS-SCNC: 9 MMOL/L (ref 7–15)
AST SERPL W P-5'-P-CCNC: 16 U/L (ref 10–35)
BILIRUB SERPL-MCNC: 0.7 MG/DL
BUN SERPL-MCNC: 9.9 MG/DL (ref 8–23)
CALCIUM SERPL-MCNC: 9.2 MG/DL (ref 8.8–10.2)
CHLORIDE SERPL-SCNC: 104 MMOL/L (ref 98–107)
CHOLEST SERPL-MCNC: 221 MG/DL
CREAT SERPL-MCNC: 0.84 MG/DL (ref 0.51–0.95)
DEPRECATED HCO3 PLAS-SCNC: 28 MMOL/L (ref 22–29)
EST. AVERAGE GLUCOSE BLD GHB EST-MCNC: 134 MG/DL
GFR SERPL CREATININE-BSD FRML MDRD: 76 ML/MIN/1.73M2
GLUCOSE SERPL-MCNC: 99 MG/DL (ref 70–99)
HBA1C MFR BLD: 6.3 %
HDLC SERPL-MCNC: 52 MG/DL
LDLC SERPL CALC-MCNC: 142 MG/DL
NONHDLC SERPL-MCNC: 169 MG/DL
POTASSIUM SERPL-SCNC: 4.6 MMOL/L (ref 3.4–5.3)
PROT SERPL-MCNC: 6.7 G/DL (ref 6.4–8.3)
SODIUM SERPL-SCNC: 141 MMOL/L (ref 136–145)
TRIGL SERPL-MCNC: 134 MG/DL
TSH SERPL DL<=0.005 MIU/L-ACNC: 1.28 UIU/ML (ref 0.3–4.2)

## 2023-03-09 PROCEDURE — 84443 ASSAY THYROID STIM HORMONE: CPT | Mod: ZL | Performed by: NURSE PRACTITIONER

## 2023-03-09 PROCEDURE — G0438 PPPS, INITIAL VISIT: HCPCS | Performed by: NURSE PRACTITIONER

## 2023-03-09 PROCEDURE — 36415 COLL VENOUS BLD VENIPUNCTURE: CPT | Mod: ZL | Performed by: NURSE PRACTITIONER

## 2023-03-09 PROCEDURE — 80053 COMPREHEN METABOLIC PANEL: CPT | Mod: ZL | Performed by: NURSE PRACTITIONER

## 2023-03-09 PROCEDURE — 83036 HEMOGLOBIN GLYCOSYLATED A1C: CPT | Mod: ZL | Performed by: NURSE PRACTITIONER

## 2023-03-09 PROCEDURE — 80061 LIPID PANEL: CPT | Mod: ZL | Performed by: NURSE PRACTITIONER

## 2023-03-09 ASSESSMENT — ANXIETY QUESTIONNAIRES
5. BEING SO RESTLESS THAT IT IS HARD TO SIT STILL: NOT AT ALL
7. FEELING AFRAID AS IF SOMETHING AWFUL MIGHT HAPPEN: NOT AT ALL
6. BECOMING EASILY ANNOYED OR IRRITABLE: NOT AT ALL
IF YOU CHECKED OFF ANY PROBLEMS ON THIS QUESTIONNAIRE, HOW DIFFICULT HAVE THESE PROBLEMS MADE IT FOR YOU TO DO YOUR WORK, TAKE CARE OF THINGS AT HOME, OR GET ALONG WITH OTHER PEOPLE: NOT DIFFICULT AT ALL
4. TROUBLE RELAXING: NOT AT ALL
3. WORRYING TOO MUCH ABOUT DIFFERENT THINGS: NOT AT ALL
1. FEELING NERVOUS, ANXIOUS, OR ON EDGE: NOT AT ALL
GAD7 TOTAL SCORE: 0
2. NOT BEING ABLE TO STOP OR CONTROL WORRYING: NOT AT ALL
GAD7 TOTAL SCORE: 0

## 2023-03-09 ASSESSMENT — ACTIVITIES OF DAILY LIVING (ADL): CURRENT_FUNCTION: NO ASSISTANCE NEEDED

## 2023-03-09 ASSESSMENT — ENCOUNTER SYMPTOMS
EYE PAIN: 0
PALPITATIONS: 0
JOINT SWELLING: 1
CONSTIPATION: 0
HEADACHES: 1
COUGH: 0
FEVER: 0
FREQUENCY: 0
DIZZINESS: 0
HEARTBURN: 0
SORE THROAT: 0
WEAKNESS: 0
DIARRHEA: 0
NERVOUS/ANXIOUS: 0
MYALGIAS: 0
SHORTNESS OF BREATH: 0
HEMATOCHEZIA: 0
ARTHRALGIAS: 1
HEMATURIA: 0
NAUSEA: 0
PARESTHESIAS: 0
DYSURIA: 0
CHILLS: 0
BREAST MASS: 0
ABDOMINAL PAIN: 0

## 2023-03-09 ASSESSMENT — PATIENT HEALTH QUESTIONNAIRE - PHQ9: SUM OF ALL RESPONSES TO PHQ QUESTIONS 1-9: 2

## 2023-03-09 ASSESSMENT — PAIN SCALES - GENERAL: PAINLEVEL: NO PAIN (0)

## 2023-03-09 NOTE — PATIENT INSTRUCTIONS
Patient Education   Personalized Prevention Plan  You are due for the preventive services outlined below.  Your care team is available to assist you in scheduling these services.  If you have already completed any of these items, please share that information with your care team to update in your medical record.  Health Maintenance Due   Topic Date Due     Discuss Advance Care Planning  Never done     Depression Action Plan  Never done     Hepatitis C Screening  Never done     Annual Wellness Visit  06/21/2020

## 2023-03-09 NOTE — LETTER
My Depression Action Plan  Name: Rosa Camejo   Date of Birth 1955  Date: 3/9/2023    My doctor: Amanda Meehan   My clinic: [facility]  [address]  [address]  [place] [address]  [phone]          GREEN    ZONE   Good Control    What it looks like:     Things are going generally well. You have normal ups and downs. You may even feel depressed from time to time, but bad moods usually last less than a day.   What you need to do:  1. Continue to care for yourself (see self care plan)  2. Check your depression survival kit and update it as needed  3. Follow your physician s recommendations including any medication.  4. Do not stop taking medication unless you consult with your physician first.           YELLOW         ZONE Getting Worse    What it looks like:     Depression is starting to interfere with your life.     It may be hard to get out of bed; you may be starting to isolate yourself from others.    Symptoms of depression are starting to last most all day and this has happened for several days.     You may have suicidal thoughts but they are not constant.   What you need to do:     1. Call your care team. Your response to treatment will improve if you keep your care team informed of your progress. Yellow periods are signs an adjustment may need to be made.     2. Continue your self-care.  Just get dressed and ready for the day.  Don't give yourself time to talk yourself out of it.    3. Talk to someone in your support network.    4. Open up your Depression Self-Care Plan/Wellness Kit.           RED    ZONE Medical Alert - Get Help    What it looks like:     Depression is seriously interfering with your life.     You may experience these or other symptoms: You can t get out of bed most days, can t work or engage in other necessary activities, you have trouble taking care of basic hygiene, or basic responsibilities, thoughts of suicide or death that  will not go away, self-injurious behavior.     What you need to do:  1. Call your care team and request a same-day appointment. If they are not available (weekends or after hours) call your local crisis line, emergency room or 911.          Depression Self-Care Plan / Wellness Kit    Many people find that medication and therapy are helpful treatments for managing depression. In addition, making small changes to your everyday life can help to boost your mood and improve your wellbeing. Below are some tips for you to consider. Be sure to talk with your medical provider and/or behavioral health consultant if your symptoms are worsening or not improving.     Sleep   Sleep hygiene  means all of the habits that support good, restful sleep. It includes maintaining a consistent bedtime and wake time, using your bedroom only for sleeping or sex, and keeping the bedroom dark and free of distractions like a computer, smartphone, or television.     Develop a Healthy Routine  Maintain good hygiene. Get out of bed in the morning, make your bed, brush your teeth, take a shower, and get dressed. Don t spend too much time viewing media that makes you feel stressed. Find time to relax each day.    Exercise  Get some form of exercise every day. This will help reduce pain and release endorphins, the  feel good  chemicals in your brain. It can be as simple as just going for a walk or doing some gardening, anything that will get you moving.      Diet  Strive to eat healthy foods, including fruits and vegetables. Drink plenty of water. Avoid excessive sugar, caffeine, alcohol, and other mood-altering substances.     Stay Connected with Others  Stay in touch with friends and family members.    Manage Your Mood  Try deep breathing, massage therapy, biofeedback, or meditation. Take part in fun activities when you can. Try to find something to smile about each day.     Psychotherapy  Be open to working with a therapist if your provider  recommends it.     Medication  Be sure to take your medication as prescribed. Most anti-depressants need to be taken every day. It usually takes several weeks for medications to work. Not all medicines work for all people. It is important to follow-up with your provider to make sure you have a treatment plan that is working for you. Do not stop your medication abruptly without first discussing it with your provider.    Crisis Resources   These hotlines are for both adults and children. They and are open 24 hours a day, 7 days a week unless noted otherwise.      National Suicide Prevention Lifeline   988 or 8-024-184-SHQN (6016)      Crisis Text Line    www.crisistextline.org  Text HOME to 385256 from anywhere in the [place], anytime, about any type of crisis. A live, trained crisis counselor will receive the text and respond quickly.      [place] Lifeline for LGBTQ Youth  A national crisis intervention and suicide lifeline for LGBTQ youth under 25. Provides a safe place to talk without judgement. Call 1-561.277.4456; text START to 876518 or visit www.thetrevorproject.org to talk to a trained counselor.      For [facility] crisis numbers, visit the [facility] website at:  https://[place].gov/dhs/people-we-serve/adults/health-care/mental-health/resources/crisis-contacts.jsp

## 2023-03-09 NOTE — PROGRESS NOTES
"Answers for HPI/ROS submitted by the patient on 3/9/2023  In general, how would you rate your overall physical health?: good  Frequency of exercise:: 2-3 days/week  Do you usually eat at least 4 servings of fruit and vegetables a day, include whole grains & fiber, and avoid regularly eating high fat or \"junk\" foods? : Yes  Taking medications regularly:: Yes  Medication side effects:: None  Activities of Daily Living: no assistance needed  Home safety: no safety concerns identified  Hearing Impairment:: no hearing concerns  In the past 6 months, have you been bothered by leaking of urine?: No  abdominal pain: No  Blood in stool: No  Blood in urine: No  chest pain: No  chills: No  congestion: No  constipation: No  cough: No  diarrhea: No  dizziness: No  ear pain: No  eye pain: No  nervous/anxious: No  fever: No  frequency: No  genital sores: No  headaches: Yes  hearing loss: No  heartburn: No  arthralgias: Yes  joint swelling: Yes  peripheral edema: No  mood changes: No  myalgias: No  nausea: No  dysuria: No  palpitations: No  Skin sensation changes: No  sore throat: No  urgency: No  rash: No  shortness of breath: No  visual disturbance: No  weakness: No  pelvic pain: No  vaginal bleeding: No  vaginal discharge: No  tenderness: No  breast mass: No  breast discharge: No  In general, how would you rate your overall mental or emotional health?: good  Additional concerns today:: No  Duration of exercise:: 15-30 minutes      SUBJECTIVE:   Rosa Camejo is a 67 year old female who presents for Preventive Visit.      Patient has been advised of split billing requirements and indicates understanding: Yes      Physical Health:  Answers for HPI/ROS submitted by the patient on 3/9/2023  In general, how would you rate your overall physical health?: good  Frequency of exercise:: 2-3 days/week  Do you usually eat at least 4 servings of fruit and vegetables a day, include whole grains & fiber, and avoid regularly eating high fat or " "\"junk\" foods? : Yes  Taking medications regularly:: Yes  Medication side effects:: None  Activities of Daily Living: no assistance needed  Home safety: no safety concerns identified  Hearing Impairment:: no hearing concerns  In the past 6 months, have you been bothered by leaking of urine?: No  abdominal pain: No  Blood in stool: No  Blood in urine: No  chest pain: No  chills: No  congestion: No  constipation: No  cough: No  diarrhea: No  dizziness: No  ear pain: No  eye pain: No  nervous/anxious: No  fever: No  frequency: No  genital sores: No  headaches: Yes  hearing loss: No  heartburn: No  arthralgias: Yes  joint swelling: Yes  peripheral edema: No  mood changes: No  myalgias: No  nausea: No  dysuria: No  palpitations: No  Skin sensation changes: No  sore throat: No  urgency: No  rash: No  shortness of breath: No  visual disturbance: No  weakness: No  pelvic pain: No  vaginal bleeding: No  vaginal discharge: No  tenderness: No  breast mass: No  breast discharge: No  In general, how would you rate your overall mental or emotional health?: good  Additional concerns today:: No  Duration of exercise:: 15-30 minutes    Mental Health:    In general, how would you rate your overall mental or emotional health?   PHQ-2 Score:      Do you feel safe in your environment? Yes    Have you ever done Advance Care Planning? (For example, a Health Directive, POLST, or a discussion with a medical provider or your loved ones about your wishes): No, advance care planning information given to patient to review.  Patient declined advance care planning discussion at this time.    Additional concerns to address?  No    Fall risk:  Fallen 2 or more times in the past year?: No  Any fall with injury in the past year?: Yes    Cognitive Screenin) Repeat 3 items (Leader, Season, Table)    2) Clock draw: NORMAL  3) 3 item recall: Recalls 2 objects   Results: NORMAL clock, 1-2 items recalled: COGNITIVE IMPAIRMENT LESS LIKELY    Mini-CogTM " Copyright KAYLEEN Sanchez. Licensed by the author for use in [facility]; reprinted with permission (abbey@[facility]). All rights reserved.      Do you have sleep apnea, excessive snoring or daytime drowsiness?: no        Diabetes Follow-up      How often are you checking your blood sugar? Not at all    What concerns do you have today about your diabetes? Could not start Ozempic due to insurance coverage     Do you have any of these symptoms? (Select all that apply)  No numbness or tingling in feet.  No redness, sores or blisters on feet.  No complaints of excessive thirst.  No reports of blurry vision.  No significant changes to weight.  Has not been taking metformin due to weight gain.     BP Readings from Last 2 Encounters:   03/09/23 118/66   02/24/23 111/53     Hemoglobin A1C (%)   Date Value   10/21/2022 6.1 (H)     LDL Cholesterol Calculated (mg/dL)   Date Value   10/21/2022 75     BP Readings from Last 3 Encounters:   03/09/23 118/66   02/24/23 111/53   02/13/23 124/74    Wt Readings from Last 3 Encounters:   03/09/23 126.9 kg (279 lb 12.8 oz)   02/24/23 124.3 kg (274 lb)   02/13/23 126.6 kg (279 lb)                      Hyperlipidemia Follow-Up      Are you regularly taking any medication or supplement to lower your cholesterol?   Yes- crestor    Are you having muscle aches or other side effects that you think could be caused by your cholesterol lowering medication?  No    Hypertension Follow-up      Do you check your blood pressure regularly outside of the clinic? No     Are you following a low salt diet? No    Are your blood pressures ever more than 140 on the top number (systolic) OR more   than 90 on the bottom number (diastolic), for example 140/90? no    Depression and Anxiety Follow-Up    How are you doing with your depression since your last visit? Stable on klonopin and cymbalta    How are you doing with your anxiety since your last visit?  As above    Are you having other symptoms that might be  associated with depression or anxiety? No    Have you had a significant life event? No     Do you have any concerns with your use of alcohol or other drugs? No    Social History     Tobacco Use     Smoking status: Never     Smokeless tobacco: Never   Vaping Use     Vaping Use: Never used   Substance Use Topics     Alcohol use: Not Currently     Drug use: Not Currently     PHQ 10/21/2022 11/23/2022 3/9/2023   PHQ-9 Total Score 13 4 2   Q9: Thoughts of better off dead/self-harm past 2 weeks Not at all Not at all Not at all     YAZ-7 SCORE 10/21/2022 3/9/2023   Total Score 12 0     Reviewed and updated as needed this visit by clinical staff   Tobacco  Allergies               Reviewed and updated as needed this visit by Provider                 Social History     Tobacco Use     Smoking status: Never     Smokeless tobacco: Never   Substance Use Topics     Alcohol use: Not Currently                           Current providers sharing in care for this patient include:   Patient Care Team:  Amanda Meehan NP as PCP - General (Family Medicine)  Amanda Meehan NP as Assigned PCP    The following health maintenance items are reviewed in Epic and correct as of today:  Health Maintenance   Topic Date Due     ADVANCE CARE PLANNING  Never done     HEPATITIS C SCREENING  Never done     MEDICARE ANNUAL WELLNESS VISIT  06/21/2020     YAZ ASSESSMENT  04/21/2023     PHQ-9  05/23/2023     FALL RISK ASSESSMENT  03/09/2024     MAMMO SCREENING  05/24/2024     LIPID  10/21/2027     COLORECTAL CANCER SCREENING  02/24/2028     DTAP/TDAP/TD IMMUNIZATION (4 - Td or Tdap) 11/23/2032     DEXA  11/28/2037     DEPRESSION ACTION PLAN  Completed     INFLUENZA VACCINE  Completed     Pneumococcal Vaccine: 65+ Years  Completed     ZOSTER IMMUNIZATION  Completed     COVID-19 Vaccine  Completed     IPV IMMUNIZATION  Aged Out     MENINGITIS IMMUNIZATION  Aged Out           ROS:  CONSTITUTIONAL: NEGATIVE for fever, chills, change in  "weight  INTEGUMENTARY/SKIN: NEGATIVE for worrisome rashes, moles or lesions  EYES: NEGATIVE for vision changes or irritation  ENT/MOUTH: NEGATIVE for ear, mouth and throat problems  RESP: NEGATIVE for significant cough or SOB  BREAST: NEGATIVE for masses, tenderness or discharge  CV: NEGATIVE for chest pain, palpitations or peripheral edema  GI: NEGATIVE for nausea, abdominal pain, heartburn, or change in bowel habits  : NEGATIVE for frequency, dysuria, or hematuria  MUSCULOSKELETAL: NEGATIVE for significant arthralgias or myalgia  NEURO: NEGATIVE for weakness, dizziness or paresthesias  ENDOCRINE: NEGATIVE for temperature intolerance, skin/hair changes  HEME: NEGATIVE for bleeding problems  PSYCHIATRIC: NEGATIVE for changes in mood or affect    OBJECTIVE:   /66 (BP Location: Right arm, Patient Position: Chair, Cuff Size: Adult Regular)   Pulse 63   Temp 97  F (36.1  C) (Tympanic)   Resp 16   Ht 1.626 m (5' 4\")   Wt 126.9 kg (279 lb 12.8 oz)   SpO2 98%   BMI 48.03 kg/m   Estimated body mass index is 48.03 kg/m  as calculated from the following:    Height as of this encounter: 1.626 m (5' 4\").    Weight as of this encounter: 126.9 kg (279 lb 12.8 oz).  EXAM:   GENERAL: healthy, alert and no distress, obese  EYES: Eyes grossly normal to inspection, PERRL and conjunctivae and sclerae normal  HENT: ear canals and TM's normal, nose and mouth without ulcers or lesions  NECK: no adenopathy, no asymmetry, masses, or scars and thyroid normal to palpation  RESP: lungs clear to auscultation - no rales, rhonchi or wheezes  CV: regular rate and rhythm, normal S1 S2, no S3 or S4, no murmur, click or rub, no peripheral edema and peripheral pulses strong  MS: no gross musculoskeletal defects noted, no edema  PSYCH: mentation appears normal, affect normal/bright        ASSESSMENT / PLAN:   1. Encounter for Medicare annual wellness exam  Exam completed     2. Pre-diabetes  Restart metfomrin  - exenatide ER (BYDUREON " "BCISE) 2 MG/0.85ML auto-injector; Inject 2 mg Subcutaneous every 7 days  Dispense: 3.4 mL; Refill: 3  - Hemoglobin A1c; Future    3. Hyperlipidemia LDL goal <100  continue crestor  - Lipid Profile; Future    4. Mild recurrent major depression (H)  Stable, continue current plan    5. Essential hypertension  Well controlled   - Comprehensive metabolic panel; Future  - TSH with free T4 reflex; Future    6. Generalized anxiety disorder  Stable     7. Morbid obesity with BMI of 40.0-44.9, adult (H)  Weight loss encouraged.   - exenatide ER (BYDUREON BCISE) 2 MG/0.85ML auto-injector; Inject 2 mg Subcutaneous every 7 days  Dispense: 3.4 mL; Refill: 3    8. On statin therapy  - Comprehensive metabolic panel; Future      Patient has been advised of split billing requirements and indicates understanding: Yes    COUNSELING:  Reviewed preventive health counseling, as reflected in patient instructions       Regular exercise       Healthy diet/nutrition       Vision screening       Hearing screening       Dental care       Colon cancer screening    Estimated body mass index is 48.03 kg/m  as calculated from the following:    Height as of this encounter: 1.626 m (5' 4\").    Weight as of this encounter: 126.9 kg (279 lb 12.8 oz).    Weight management plan: Discussed healthy diet and exercise guidelines    She reports that she has never smoked. She has never used smokeless tobacco.    Appropriate preventive services were discussed with this patient, including applicable screening as appropriate for cardiovascular disease, diabetes, osteopenia/osteoporosis, and glaucoma.  As appropriate for age/gender, discussed screening for colorectal cancer, prostate cancer, breast cancer, and cervical cancer. Checklist reviewing preventive services available has been given to the patient.    Reviewed patients plan of care and provided an AVS. The Intermediate Care Plan ( asthma action plan, low back pain action plan, and migraine action plan) for " Rosa meets the Care Plan requirement. This Care Plan has been established and reviewed with the Patient.    Counseling Resources:  ATP IV Guidelines  Pooled Cohorts Equation Calculator  Breast Cancer Risk Calculator  BRCA-Related Cancer Risk Assessment: FHS-7 Tool  FRAX Risk Assessment  ICSI Preventive Guidelines  Dietary Guidelines for Americans, 2010  USDA's MyPlate  ASA Prophylaxis  Lung CA Screening    Amanda Meehan, NP  [facility]

## 2023-03-10 ENCOUNTER — TELEPHONE (OUTPATIENT)
Dept: FAMILY MEDICINE | Facility: OTHER | Age: 68
End: 2023-03-10

## 2023-03-10 DIAGNOSIS — E78.5 HYPERLIPIDEMIA LDL GOAL <100: Primary | ICD-10-CM

## 2023-03-10 RX ORDER — ROSUVASTATIN CALCIUM 20 MG/1
20 TABLET, COATED ORAL DAILY
Qty: 90 TABLET | Refills: 1 | Status: SHIPPED | OUTPATIENT
Start: 2023-03-10 | End: 2023-07-10

## 2023-03-10 NOTE — TELEPHONE ENCOUNTER
Received DENIAL from [facility] for Bydureon BCise 2MG/0.85ML auto-injectors. 03/10/2023    Forms scanned to Epic.

## 2023-03-10 NOTE — TELEPHONE ENCOUNTER
Received PA from [facility] for Bydureon BCise 2MG/0.85ML auto-injectors. Submitted on CMM. Waiting for a response.

## 2023-03-17 ENCOUNTER — TELEPHONE (OUTPATIENT)
Dept: FAMILY MEDICINE | Facility: OTHER | Age: 68
End: 2023-03-17

## 2023-03-17 NOTE — TELEPHONE ENCOUNTER
Patient calling regarding   exenatide ER (BYDUREON BCISE) 2 MG/0.85ML auto-injector    Reports medication PA was denied and was advised to call by the pharmacy.    Is there an alternative for patient or is provider appealing denial.     Please advise, thank you.

## 2023-03-17 NOTE — TELEPHONE ENCOUNTER
Lab Results   Component Value Date    A1C 6.3 03/09/2023    A1C 6.1 10/21/2022     Reviewed PA/Denial.  I do not think any injectables will be covered for pre-diabetes, only diabetes.  Continue metformin.

## 2023-05-18 ENCOUNTER — OFFICE VISIT (OUTPATIENT)
Dept: FAMILY MEDICINE | Facility: OTHER | Age: 68
End: 2023-05-18
Attending: NURSE PRACTITIONER
Payer: MEDICARE

## 2023-05-18 ENCOUNTER — ANCILLARY PROCEDURE (OUTPATIENT)
Dept: GENERAL RADIOLOGY | Facility: OTHER | Age: 68
End: 2023-05-18
Attending: NURSE PRACTITIONER
Payer: MEDICARE

## 2023-05-18 VITALS
RESPIRATION RATE: 18 BRPM | SYSTOLIC BLOOD PRESSURE: 122 MMHG | HEART RATE: 78 BPM | OXYGEN SATURATION: 100 % | WEIGHT: 274.4 LBS | TEMPERATURE: 97.2 F | DIASTOLIC BLOOD PRESSURE: 74 MMHG | BODY MASS INDEX: 47.1 KG/M2

## 2023-05-18 DIAGNOSIS — M79.675 PAIN OF TOE OF LEFT FOOT: ICD-10-CM

## 2023-05-18 DIAGNOSIS — I83.90 RECURRENT VARICOSE VEIN OF LOWER EXTREMITY: ICD-10-CM

## 2023-05-18 DIAGNOSIS — Z87.81 HISTORY OF TOE FRACTURE: ICD-10-CM

## 2023-05-18 DIAGNOSIS — G62.9 PERIPHERAL POLYNEUROPATHY: Primary | ICD-10-CM

## 2023-05-18 LAB
BASOPHILS # BLD AUTO: 0 10E3/UL (ref 0–0.2)
BASOPHILS NFR BLD AUTO: 1 %
EOSINOPHIL # BLD AUTO: 0.4 10E3/UL (ref 0–0.7)
EOSINOPHIL NFR BLD AUTO: 8 %
ERYTHROCYTE [DISTWIDTH] IN BLOOD BY AUTOMATED COUNT: 15.3 % (ref 10–15)
HCT VFR BLD AUTO: 41.1 % (ref 35–47)
HGB BLD-MCNC: 12.8 G/DL (ref 11.7–15.7)
LYMPHOCYTES # BLD AUTO: 1.4 10E3/UL (ref 0.8–5.3)
LYMPHOCYTES NFR BLD AUTO: 25 %
MCH RBC QN AUTO: 26.7 PG (ref 26.5–33)
MCHC RBC AUTO-ENTMCNC: 31.1 G/DL (ref 31.5–36.5)
MCV RBC AUTO: 86 FL (ref 78–100)
MONOCYTES # BLD AUTO: 0.4 10E3/UL (ref 0–1.3)
MONOCYTES NFR BLD AUTO: 7 %
NEUTROPHILS # BLD AUTO: 3.3 10E3/UL (ref 1.6–8.3)
NEUTROPHILS NFR BLD AUTO: 60 %
PLATELET # BLD AUTO: 259 10E3/UL (ref 150–450)
RBC # BLD AUTO: 4.79 10E6/UL (ref 3.8–5.2)
URATE SERPL-MCNC: 3.4 MG/DL (ref 2.4–5.7)
WBC # BLD AUTO: 5.5 10E3/UL (ref 4–11)

## 2023-05-18 PROCEDURE — 73630 X-RAY EXAM OF FOOT: CPT | Mod: TC,LT,FY

## 2023-05-18 PROCEDURE — 85004 AUTOMATED DIFF WBC COUNT: CPT | Mod: ZL | Performed by: NURSE PRACTITIONER

## 2023-05-18 PROCEDURE — G0463 HOSPITAL OUTPT CLINIC VISIT: HCPCS

## 2023-05-18 PROCEDURE — 99214 OFFICE O/P EST MOD 30 MIN: CPT | Performed by: NURSE PRACTITIONER

## 2023-05-18 PROCEDURE — 84550 ASSAY OF BLOOD/URIC ACID: CPT | Mod: ZL | Performed by: NURSE PRACTITIONER

## 2023-05-18 PROCEDURE — 36415 COLL VENOUS BLD VENIPUNCTURE: CPT | Mod: ZL | Performed by: NURSE PRACTITIONER

## 2023-05-18 RX ORDER — GABAPENTIN 100 MG/1
100 CAPSULE ORAL 3 TIMES DAILY
Qty: 90 CAPSULE | Refills: 1 | Status: SHIPPED | OUTPATIENT
Start: 2023-05-18 | End: 2023-06-27

## 2023-05-18 RX ORDER — ALBUTEROL SULFATE 90 UG/1
AEROSOL, METERED RESPIRATORY (INHALATION)
COMMUNITY
Start: 2023-03-14 | End: 2024-05-21

## 2023-05-18 ASSESSMENT — PATIENT HEALTH QUESTIONNAIRE - PHQ9
10. IF YOU CHECKED OFF ANY PROBLEMS, HOW DIFFICULT HAVE THESE PROBLEMS MADE IT FOR YOU TO DO YOUR WORK, TAKE CARE OF THINGS AT HOME, OR GET ALONG WITH OTHER PEOPLE: NOT DIFFICULT AT ALL
SUM OF ALL RESPONSES TO PHQ QUESTIONS 1-9: 6
SUM OF ALL RESPONSES TO PHQ QUESTIONS 1-9: 6

## 2023-05-18 ASSESSMENT — ANXIETY QUESTIONNAIRES
GAD7 TOTAL SCORE: 4
1. FEELING NERVOUS, ANXIOUS, OR ON EDGE: SEVERAL DAYS
7. FEELING AFRAID AS IF SOMETHING AWFUL MIGHT HAPPEN: SEVERAL DAYS
8. IF YOU CHECKED OFF ANY PROBLEMS, HOW DIFFICULT HAVE THESE MADE IT FOR YOU TO DO YOUR WORK, TAKE CARE OF THINGS AT HOME, OR GET ALONG WITH OTHER PEOPLE?: NOT DIFFICULT AT ALL
7. FEELING AFRAID AS IF SOMETHING AWFUL MIGHT HAPPEN: SEVERAL DAYS
5. BEING SO RESTLESS THAT IT IS HARD TO SIT STILL: NOT AT ALL
GAD7 TOTAL SCORE: 4
3. WORRYING TOO MUCH ABOUT DIFFERENT THINGS: SEVERAL DAYS
GAD7 TOTAL SCORE: 4
4. TROUBLE RELAXING: NOT AT ALL
6. BECOMING EASILY ANNOYED OR IRRITABLE: NOT AT ALL
2. NOT BEING ABLE TO STOP OR CONTROL WORRYING: SEVERAL DAYS
IF YOU CHECKED OFF ANY PROBLEMS ON THIS QUESTIONNAIRE, HOW DIFFICULT HAVE THESE PROBLEMS MADE IT FOR YOU TO DO YOUR WORK, TAKE CARE OF THINGS AT HOME, OR GET ALONG WITH OTHER PEOPLE: NOT DIFFICULT AT ALL

## 2023-05-18 ASSESSMENT — PAIN SCALES - GENERAL: PAINLEVEL: MODERATE PAIN (4)

## 2023-05-18 NOTE — PROGRESS NOTES
Assessment & Plan     1. Peripheral polyneuropathy  Trial on gabapentin.    - gabapentin (NEURONTIN) 100 MG capsule; Take 1 capsule (100 mg) by mouth 3 times daily  Dispense: 90 capsule; Refill: 1    2. Pain of toe of left foot  No acute fracture noted, arthritic changes noted.  Uric acid normal  - XR FOOT LT G/E 3 VW (Clinic Performed); Future  - Uric acid  - CBC with platelets and differential    3. History of toe fracture  As above    4. Recurrent varicose vein of lower extremity  Consider compression stockings.        Follow-up next month as scheduled.     Amanda Meehan NP  [facility]    Myrna Lee is a 67 year old, presenting for the following health issues:  Leg Pain    HPI     Concern - burn - left side leg  Onset: 2 months  Description: burning, pins and needles pain in left lower leg  Intensity: 4/10  Progression of Symptoms:  worsening and intermittent  Accompanying Signs & Symptoms: none.  Denies redness, swelling, pain with palpation or skin warmth.  Does have a history of varicose veins.    Previous history of similar problem: none  Precipitating factors:        Worsened by: nothing  Alleviating factors:        Improved by: nothing  Therapies tried and outcome:  none     She also notes pain of 3rd toe of left foot.  She fractured the end of the toe last year.  Since she has pain with palpation, pressure from shoes and sheets touching her foot.       Recent Labs   Lab Test 03/09/23  1104 02/13/23  1402 10/21/22  1108 10/21/22  1108 07/27/19  1353   A1C 6.3*  --   --  6.1*  --    *  --   --  75  --    HDL 52  --   --  56  --    TRIG 134  --   --  104  --    ALT 11  --   --  13 14   CR 0.84 0.89   < > 0.84 0.82   GFRESTIMATED 76 71   < > 76 76   GFRESTBLACK  --   --   --   --  88   POTASSIUM 4.6 4.2   < > 4.3 3.9   TSH 1.28  --   --  1.16  --     < > = values in this interval not displayed.      BP Readings from Last 3 Encounters:   05/18/23 122/74    03/09/23 118/66   02/24/23 111/53    Wt Readings from Last 3 Encounters:   05/18/23 124.5 kg (274 lb 6.4 oz)   03/09/23 126.9 kg (279 lb 12.8 oz)   02/24/23 124.3 kg (274 lb)                        Review of Systems   Constitutional, HEENT, cardiovascular, pulmonary, gi and gu systems are negative, except as otherwise noted.      Objective    /74 (BP Location: Right arm, Patient Position: Sitting, Cuff Size: Adult Regular)   Pulse 78   Temp 97.2  F (36.2  C) (Tympanic)   Resp 18   Wt 124.5 kg (274 lb 6.4 oz)   SpO2 100%   BMI 47.10 kg/m    Body mass index is 47.1 kg/m .  Physical Exam   GENERAL: alert, no distress and obese  RESP: lungs clear to auscultation - no rales, rhonchi or wheezes  CV: regular rate and rhythm, normal S1 S2, no S3 or S4, no murmur, click or rub, no peripheral edema and peripheral pulses strong  MS: tenderness of 3rd toe of left foot.  No swelling.  Left shin is of normal color, normal temp, no swelling noted.  Decreased sensation to that area of shin when compared to other side.    SKIN: no suspicious lesions or rashes  PSYCH: mentation appears normal, affect normal/bright        Results for orders placed or performed in visit on 05/18/23   XR FOOT LT G/E 3 VW (Clinic Performed)     Status: None    Narrative    PROCEDURE:  XR FOOT LEFT G/E 3 VIEWS    HISTORY: Pain of toe of left foot    COMPARISON:  Radiograph still 2422    TECHNIQUE:  XR FOOT LEFT 3 VIEWS    FINDINGS:   No fracture or dislocation is identified. Mild-to-moderate  degenerative changes of the interphalangeal joints. Calcaneal Achilles  enthesopathy.    No foreign body is seen.     Soft tissues are within normal limits.       Impression    IMPRESSION:   No acute osseous abnormality.    BERTHA SAINI MD         SYSTEM ID:  [id number]   Results for orders placed or performed in visit on 05/18/23   Uric acid     Status: Normal   Result Value Ref Range    Uric Acid 3.4 2.4 - 5.7 mg/dL   CBC with platelets and differential      Status: Abnormal   Result Value Ref Range    WBC Count 5.5 4.0 - 11.0 10e3/uL    RBC Count 4.79 3.80 - 5.20 10e6/uL    Hemoglobin 12.8 11.7 - 15.7 g/dL    Hematocrit 41.1 35.0 - 47.0 %    MCV 86 78 - 100 fL    MCH 26.7 26.5 - 33.0 pg    MCHC 31.1 (L) 31.5 - 36.5 g/dL    RDW 15.3 (H) 10.0 - 15.0 %    Platelet Count 259 150 - 450 10e3/uL    % Neutrophils 60 %    % Lymphocytes 25 %    % Monocytes 7 %    % Eosinophils 8 %    % Basophils 1 %    Absolute Neutrophils 3.3 1.6 - 8.3 10e3/uL    Absolute Lymphocytes 1.4 0.8 - 5.3 10e3/uL    Absolute Monocytes 0.4 0.0 - 1.3 10e3/uL    Absolute Eosinophils 0.4 0.0 - 0.7 10e3/uL    Absolute Basophils 0.0 0.0 - 0.2 10e3/uL   CBC with platelets and differential     Status: Abnormal    Narrative    The following orders were created for panel order CBC with platelets and differential.  Procedure                               Abnormality         Status                     ---------                               -----------         ------                     CBC with platelets and d...[[id number]]  Abnormal            Final result                 Please view results for these tests on the individual orders.

## 2023-06-03 ENCOUNTER — TRANSFERRED RECORDS (OUTPATIENT)
Dept: HEALTH INFORMATION MANAGEMENT | Facility: CLINIC | Age: 68
End: 2023-06-03

## 2023-06-13 ENCOUNTER — TELEPHONE (OUTPATIENT)
Dept: FAMILY MEDICINE | Facility: OTHER | Age: 68
End: 2023-06-13

## 2023-06-13 DIAGNOSIS — I10 ESSENTIAL HYPERTENSION: ICD-10-CM

## 2023-06-13 DIAGNOSIS — E78.5 HYPERLIPIDEMIA LDL GOAL <100: Primary | ICD-10-CM

## 2023-06-13 DIAGNOSIS — Z79.899 ON STATIN THERAPY: ICD-10-CM

## 2023-06-13 DIAGNOSIS — R73.03 PRE-DIABETES: ICD-10-CM

## 2023-06-13 NOTE — TELEPHONE ENCOUNTER
"10:13 AM    Reason for Call: Phone Call    Description: Patient called in requesting a blood sugar test for before her 6-21-23 appointment as she feels she's been having \"attacks\" more frequently. Please call patient back.    Was an appointment offered for this call? No  If yes : Appointment type              Date    Preferred method for responding to this message: Telephone Call  What is your phone number ? [phone]    If we cannot reach you directly, may we leave a detailed response at the number you provided? Yes    Can this message wait until your PCP/provider returns, if available today? YES    Azucena Roth    "

## 2023-06-14 NOTE — TELEPHONE ENCOUNTER
Lab orders placed.  Please have her come in a few days ahead of time so results are available for discussion at our appt.  What kind of attacks is she having?

## 2023-06-16 ENCOUNTER — LAB (OUTPATIENT)
Dept: LAB | Facility: OTHER | Age: 68
End: 2023-06-16
Payer: MEDICARE

## 2023-06-16 DIAGNOSIS — R73.03 PRE-DIABETES: ICD-10-CM

## 2023-06-16 DIAGNOSIS — Z11.59 NEED FOR HEPATITIS C SCREENING TEST: ICD-10-CM

## 2023-06-16 DIAGNOSIS — E78.5 HYPERLIPIDEMIA LDL GOAL <100: ICD-10-CM

## 2023-06-16 DIAGNOSIS — Z79.899 ON STATIN THERAPY: ICD-10-CM

## 2023-06-16 DIAGNOSIS — I10 ESSENTIAL HYPERTENSION: ICD-10-CM

## 2023-06-16 LAB
ALBUMIN SERPL BCG-MCNC: 3.8 G/DL (ref 3.5–5.2)
ALP SERPL-CCNC: 77 U/L (ref 35–104)
ALT SERPL W P-5'-P-CCNC: 11 U/L (ref 0–50)
ANION GAP SERPL CALCULATED.3IONS-SCNC: 9 MMOL/L (ref 7–15)
AST SERPL W P-5'-P-CCNC: 17 U/L (ref 0–45)
BILIRUB SERPL-MCNC: 1 MG/DL
BUN SERPL-MCNC: 8.2 MG/DL (ref 8–23)
CALCIUM SERPL-MCNC: 9 MG/DL (ref 8.8–10.2)
CHLORIDE SERPL-SCNC: 105 MMOL/L (ref 98–107)
CHOLEST SERPL-MCNC: 144 MG/DL
CREAT SERPL-MCNC: 0.8 MG/DL (ref 0.51–0.95)
DEPRECATED HCO3 PLAS-SCNC: 28 MMOL/L (ref 22–29)
EST. AVERAGE GLUCOSE BLD GHB EST-MCNC: 134 MG/DL
GFR SERPL CREATININE-BSD FRML MDRD: 80 ML/MIN/1.73M2
GLUCOSE SERPL-MCNC: 120 MG/DL (ref 70–99)
HBA1C MFR BLD: 6.3 %
HDLC SERPL-MCNC: 60 MG/DL
LDLC SERPL CALC-MCNC: 65 MG/DL
NONHDLC SERPL-MCNC: 84 MG/DL
POTASSIUM SERPL-SCNC: 4.4 MMOL/L (ref 3.4–5.3)
PROT SERPL-MCNC: 6.6 G/DL (ref 6.4–8.3)
SODIUM SERPL-SCNC: 142 MMOL/L (ref 136–145)
TRIGL SERPL-MCNC: 93 MG/DL
TSH SERPL DL<=0.005 MIU/L-ACNC: 1.27 UIU/ML (ref 0.3–4.2)

## 2023-06-16 PROCEDURE — 83036 HEMOGLOBIN GLYCOSYLATED A1C: CPT | Mod: ZL

## 2023-06-16 PROCEDURE — 84443 ASSAY THYROID STIM HORMONE: CPT | Mod: ZL

## 2023-06-16 PROCEDURE — 80061 LIPID PANEL: CPT | Mod: ZL

## 2023-06-16 PROCEDURE — 36415 COLL VENOUS BLD VENIPUNCTURE: CPT | Mod: ZL

## 2023-06-16 PROCEDURE — 86803 HEPATITIS C AB TEST: CPT | Mod: ZL

## 2023-06-16 PROCEDURE — 80053 COMPREHEN METABOLIC PANEL: CPT | Mod: ZL

## 2023-06-17 LAB — HCV AB SERPL QL IA: NONREACTIVE

## 2023-06-19 NOTE — PROGRESS NOTES
Assessment & Plan     1. Pre-diabetes  Stable and controlled. Her A1C goal <7 %. Discussed continuing to improve lifestyle intervention through diet and physical activity   - Continue with metformin 500 mg twice daily     2. Morbid obesity with BMI of 40.0-44.9, adult (H)  She has been taking metformin to improve insulin sensitivity, but she does not see improvement in weight loss. Ozempic was not covered and PA may be needed. Wegovy may be covered and provide weight loss and insulin sensitvity control  - If ozempic is not covered after PA consider Wegovy injection at 0.25 mg under the skin once weekly   - Continue to improve lifestyle intervention    3. Essential hypertension  Stable and controlled with BP goal <140/90  - Continue with amlodipine 2.5 mg by mouth every day   - Continue with metoprolol tartrate 50 mg twice daily    4. Gastroesophageal reflux disease without esophagitis  Stable and controlled with medications   - Continue with omeprazole 40 mg once daily     5. Hyperlipidemia LDL goal <100  Stable and controlled with medications  - Continue with rosuvastatin 20 mg once daily   - Continue to improve lifestyle intervention through diet and physical activity     6. Generalized anxiety disorder/  Mild recurrent major depression (H)  Stable and controlled.   - Continue with duloxetine 60 mg once daily   - Continue with lifestyle interventions to improve overall mood       7. Primary insomnia  Stable and improved with medications   - Continue with clonazepam 1 mg twice daily prn  - Continue with zolpidem ER 12.5 mg once daily        Return in about 3 months (around 9/21/2023).     Ricardo Jones, PA-S  [facility]     Patient is seen in conjunction with PA student.  History is reviewed with patient and pertinent portions of the exam are repeated.  Assessment and plan is reviewed with the patient.      Amanda Meehan, NP  [facility]   Rosa is a  "67 year old, presenting for the following health issues:  Diabetes, Hyperlipidemia, and Hypertension          HPI     Diabetes Follow-up      How often are you checking your blood sugar? Not at all    What concerns do you have today about your diabetes? None and Other: having \"spells\" more often     Do you have any of these symptoms? (Select all that apply)  Blurry vision     - eye clinic [facility]- due to an appointment within the next 3 months     Diabetic Foot Screen:  Any complaints of increased pain or numbness ? No  Is there a foot ulcer now or a history of foot ulcer? No  Does the foot have an abnormal shape? No  Are the nails thick, too long or ingrown? No  Are there any redness or open areas? No         Sensation Testing done at all points on the diagram with monofilament     Right Foot: Sensation Normal at all points, Absent at the following points , 4 and 10  Left Foot: Sensation Normal at all points, Absent at the following points , 4 and 10     Risk Category: 1- Loss of protective sensation with normal appearing foot (no weakness, deformity, callous, pre-ulcer or h/o ulceration)  Performed by Ricardo Jones      Hyperlipidemia Follow-Up      Are you regularly taking any medication or supplement to lower your cholesterol?   Yes- rosuvostatin    Are you having muscle aches or other side effects that you think could be caused by your cholesterol lowering medication?  No    Hypertension Follow-up      Do you check your blood pressure regularly outside of the clinic? No     Are you following a low salt diet? Yes    Are your blood pressures ever more than 140 on the top number (systolic) OR more   than 90 on the bottom number (diastolic), for example 140/90? No    - finished ciprofloxacin about one week ago. Dx with acute cystitis and possible pyelonephritis       BP Readings from Last 2 Encounters:   06/21/23 138/80   05/18/23 122/74     Hemoglobin A1C (%)   Date Value   06/16/2023 6.3 (H) " "  03/09/2023 6.3 (H)     LDL Cholesterol Calculated (mg/dL)   Date Value   06/16/2023 65   03/09/2023 142 (H)     Wt Readings from Last 4 Encounters:   06/21/23 124.1 kg (273 lb 8 oz)   05/18/23 124.5 kg (274 lb 6.4 oz)   03/09/23 126.9 kg (279 lb 12.8 oz)   02/24/23 124.3 kg (274 lb)     Review of Systems   Constitutional: Negative for chills, fatigue and unexpected weight change.   HENT: Negative.    Eyes: Negative.    Respiratory: Negative for cough, chest tightness, shortness of breath and wheezing.    Cardiovascular: Negative for chest pain and palpitations.   Gastrointestinal: Negative.    Endocrine: Negative for polydipsia, polyphagia and polyuria.   Breasts:  negative.    Genitourinary: Negative.    Musculoskeletal: Negative.    Skin: Negative.    Allergic/Immunologic: Negative.    Neurological: Negative for dizziness, light-headedness, numbness, headaches and paresthesias.   Hematological: Negative.    Psychiatric/Behavioral: Negative for mood changes and sleep disturbance.        Objective    /80 (BP Location: Left arm, Patient Position: Sitting)   Pulse 68   Temp 97.7  F (36.5  C)   Resp 17   Ht 1.626 m (5' 4\")   Wt 124.1 kg (273 lb 8 oz)   SpO2 97%   BMI 46.95 kg/m    Body mass index is 46.95 kg/m .     Physical Exam  Constitutional:       Appearance: Normal appearance.   HENT:      Head: Normocephalic and atraumatic.   Neck:      Vascular: No carotid bruit.   Cardiovascular:      Rate and Rhythm: Normal rate and regular rhythm.      Pulses: Normal pulses.      Heart sounds: Normal heart sounds.   Pulmonary:      Effort: Pulmonary effort is normal.      Breath sounds: Normal breath sounds. No wheezing, rhonchi or rales.   Abdominal:      General: Bowel sounds are normal.      Palpations: Abdomen is soft.   Musculoskeletal:      Right lower leg: No edema.      Left lower leg: No edema.   Skin:     General: Skin is warm.   Neurological:      Mental Status: She is alert and oriented to person, " place, and time.   Psychiatric:         Mood and Affect: Mood normal.         Behavior: Behavior normal.        Lab on 06/16/2023   Component Date Value Ref Range Status     TSH 06/16/2023 1.27  0.30 - 4.20 uIU/mL Final     Sodium 06/16/2023 142  136 - 145 mmol/L Final     Potassium 06/16/2023 4.4  3.4 - 5.3 mmol/L Final     Chloride 06/16/2023 105  98 - 107 mmol/L Final     Carbon Dioxide (CO2) 06/16/2023 28  22 - 29 mmol/L Final     Anion Gap 06/16/2023 9  7 - 15 mmol/L Final     Urea Nitrogen 06/16/2023 8.2  8.0 - 23.0 mg/dL Final     Creatinine 06/16/2023 0.80  0.51 - 0.95 mg/dL Final     Calcium 06/16/2023 9.0  8.8 - 10.2 mg/dL Final     Glucose 06/16/2023 120 (H)  70 - 99 mg/dL Final     Alkaline Phosphatase 06/16/2023 77  35 - 104 U/L Final     AST 06/16/2023 17  0 - 45 U/L Final    Reference intervals for this test were updated on 6/12/2023 to more accurately reflect our healthy population. There may be differences in the flagging of prior results with similar values performed with this method. Interpretation of those prior results can be made in the context of the updated reference intervals.     ALT 06/16/2023 11  0 - 50 U/L Final    Reference intervals for this test were updated on 6/12/2023 to more accurately reflect our healthy population. There may be differences in the flagging of prior results with similar values performed with this method. Interpretation of those prior results can be made in the context of the updated reference intervals.       Protein Total 06/16/2023 6.6  6.4 - 8.3 g/dL Final     Albumin 06/16/2023 3.8  3.5 - 5.2 g/dL Final     Bilirubin Total 06/16/2023 1.0  <=1.2 mg/dL Final     GFR Estimate 06/16/2023 80  >60 mL/min/1.73m2 Final    eGFR calculated using 2021 CKD-EPI equation.     Cholesterol 06/16/2023 144  <200 mg/dL Final     Triglycerides 06/16/2023 93  <150 mg/dL Final     Direct Measure HDL 06/16/2023 60  >=50 mg/dL Final     LDL Cholesterol Calculated 06/16/2023 65  <=100  mg/dL Final     Non HDL Cholesterol 06/16/2023 84  <130 mg/dL Final     Estimated Average Glucose 06/16/2023 134  mg/dL Final     Hemoglobin A1C 06/16/2023 6.3 (H)  <5.7 % Final    Normal <5.7%   Prediabetes 5.7-6.4%    Diabetes 6.5% or higher     Note: Adopted from ADA consensus guidelines.     Hepatitis C Antibody 06/16/2023 Nonreactive  Nonreactive Final

## 2023-06-21 ENCOUNTER — OFFICE VISIT (OUTPATIENT)
Dept: FAMILY MEDICINE | Facility: OTHER | Age: 68
End: 2023-06-21
Attending: FAMILY MEDICINE
Payer: MEDICARE

## 2023-06-21 VITALS
OXYGEN SATURATION: 97 % | RESPIRATION RATE: 17 BRPM | TEMPERATURE: 97.7 F | HEART RATE: 68 BPM | HEIGHT: 64 IN | WEIGHT: 273.5 LBS | BODY MASS INDEX: 46.69 KG/M2 | SYSTOLIC BLOOD PRESSURE: 138 MMHG | DIASTOLIC BLOOD PRESSURE: 80 MMHG

## 2023-06-21 DIAGNOSIS — E66.01 MORBID OBESITY WITH BMI OF 40.0-44.9, ADULT (H): ICD-10-CM

## 2023-06-21 DIAGNOSIS — I10 ESSENTIAL HYPERTENSION: ICD-10-CM

## 2023-06-21 DIAGNOSIS — F33.0 MILD RECURRENT MAJOR DEPRESSION (H): ICD-10-CM

## 2023-06-21 DIAGNOSIS — F41.1 GENERALIZED ANXIETY DISORDER: ICD-10-CM

## 2023-06-21 DIAGNOSIS — F51.01 PRIMARY INSOMNIA: ICD-10-CM

## 2023-06-21 DIAGNOSIS — E78.5 HYPERLIPIDEMIA LDL GOAL <100: ICD-10-CM

## 2023-06-21 DIAGNOSIS — R73.03 PRE-DIABETES: ICD-10-CM

## 2023-06-21 DIAGNOSIS — Z79.899 ON STATIN THERAPY: Primary | ICD-10-CM

## 2023-06-21 DIAGNOSIS — K21.9 GASTROESOPHAGEAL REFLUX DISEASE WITHOUT ESOPHAGITIS: ICD-10-CM

## 2023-06-21 PROCEDURE — 99214 OFFICE O/P EST MOD 30 MIN: CPT | Performed by: NURSE PRACTITIONER

## 2023-06-21 PROCEDURE — G0463 HOSPITAL OUTPT CLINIC VISIT: HCPCS | Performed by: NURSE PRACTITIONER

## 2023-06-21 RX ORDER — METOPROLOL TARTRATE 50 MG
25 TABLET ORAL 2 TIMES DAILY
Qty: 180 TABLET | Refills: 1 | Status: SHIPPED | OUTPATIENT
Start: 2023-06-21

## 2023-06-21 ASSESSMENT — ENCOUNTER SYMPTOMS
PARESTHESIAS: 0
ALLERGIC/IMMUNOLOGIC NEGATIVE: 1
CHEST TIGHTNESS: 0
PALPITATIONS: 0
LIGHT-HEADEDNESS: 0
EYES NEGATIVE: 1
HEMATOLOGIC/LYMPHATIC NEGATIVE: 1
MUSCULOSKELETAL NEGATIVE: 1
COUGH: 0
HEADACHES: 0
DIZZINESS: 0
SLEEP DISTURBANCE: 0
POLYDIPSIA: 0
FATIGUE: 0
CHILLS: 0
SHORTNESS OF BREATH: 0
POLYPHAGIA: 0
UNEXPECTED WEIGHT CHANGE: 0
GASTROINTESTINAL NEGATIVE: 1
NUMBNESS: 0
WHEEZING: 0

## 2023-06-21 ASSESSMENT — PATIENT HEALTH QUESTIONNAIRE - PHQ9: SUM OF ALL RESPONSES TO PHQ QUESTIONS 1-9: 1

## 2023-06-23 ENCOUNTER — TELEPHONE (OUTPATIENT)
Dept: FAMILY MEDICINE | Facility: OTHER | Age: 68
End: 2023-06-23

## 2023-06-23 NOTE — TELEPHONE ENCOUNTER
Received PA request from [employer] for Wegovy 0.25MG/0.5ML auto-injectors.  Auto denial was given after PA submitted on CMM.      This medication is not covered by patients benefit plan.  There is nothing more I am able to do with this information.    Thank you,  Nisreen

## 2023-07-09 DIAGNOSIS — E78.5 HYPERLIPIDEMIA LDL GOAL <100: ICD-10-CM

## 2023-07-10 RX ORDER — ROSUVASTATIN CALCIUM 20 MG/1
20 TABLET, COATED ORAL DAILY
Qty: 90 TABLET | Refills: 2 | Status: SHIPPED | OUTPATIENT
Start: 2023-07-10

## 2023-07-10 NOTE — TELEPHONE ENCOUNTER
Rosuvastatin (Crestor) 20 MG tablet  Last Written Prescription Date:  03/10/2023  Last Fill Quantity: 90,   # refills: 1  Last Office Visit: 06/21/2023

## 2023-07-11 DIAGNOSIS — K21.9 GASTROESOPHAGEAL REFLUX DISEASE WITHOUT ESOPHAGITIS: Primary | ICD-10-CM

## 2023-07-12 RX ORDER — OMEPRAZOLE 40 MG/1
CAPSULE, DELAYED RELEASE ORAL
Qty: 180 CAPSULE | Refills: 3 | Status: SHIPPED | OUTPATIENT
Start: 2023-07-12 | End: 2024-06-24

## 2023-07-17 ENCOUNTER — TELEPHONE (OUTPATIENT)
Dept: FAMILY MEDICINE | Facility: OTHER | Age: 68
End: 2023-07-17

## 2023-07-17 DIAGNOSIS — F33.0 MILD RECURRENT MAJOR DEPRESSION (H): ICD-10-CM

## 2023-07-17 DIAGNOSIS — F41.1 GENERALIZED ANXIETY DISORDER: Primary | ICD-10-CM

## 2023-07-17 RX ORDER — DULOXETIN HYDROCHLORIDE 60 MG/1
60 CAPSULE, DELAYED RELEASE ORAL AT BEDTIME
Qty: 30 CAPSULE | Refills: 1 | Status: SHIPPED | OUTPATIENT
Start: 2023-07-17 | End: 2023-08-24

## 2023-07-17 NOTE — TELEPHONE ENCOUNTER
12:03 PM    Reason for Call: Phone Call    Description: Patient stated that she's out of her duloxetine and has been for 5 days and is having side effects. She was told by the pharmacy that [facility] hasn't been the PCP who has been prescribing the medication and that they couldn't refill it. Please call patient back.     Was an appointment offered for this call? No  If yes : Appointment type              Date    Preferred method for responding to this message: Telephone Call  What is your phone number ? [phone]    If we cannot reach you directly, may we leave a detailed response at the number you provided? Yes    Can this message wait until your PCP/provider returns, if available today? Not applicable, provider in clinic     Azucena Roth

## 2023-08-22 ENCOUNTER — ANCILLARY PROCEDURE (OUTPATIENT)
Dept: GENERAL RADIOLOGY | Facility: OTHER | Age: 68
End: 2023-08-22
Attending: NURSE PRACTITIONER
Payer: MEDICARE

## 2023-08-22 ENCOUNTER — OFFICE VISIT (OUTPATIENT)
Dept: FAMILY MEDICINE | Facility: OTHER | Age: 68
End: 2023-08-22
Attending: NURSE PRACTITIONER
Payer: MEDICARE

## 2023-08-22 VITALS
SYSTOLIC BLOOD PRESSURE: 118 MMHG | WEIGHT: 270.7 LBS | DIASTOLIC BLOOD PRESSURE: 80 MMHG | OXYGEN SATURATION: 99 % | RESPIRATION RATE: 18 BRPM | HEIGHT: 64 IN | HEART RATE: 62 BPM | BODY MASS INDEX: 46.22 KG/M2 | TEMPERATURE: 97.7 F

## 2023-08-22 DIAGNOSIS — M62.830 SPASM OF THORACIC BACK MUSCLE: ICD-10-CM

## 2023-08-22 DIAGNOSIS — M54.6 ACUTE LEFT-SIDED THORACIC BACK PAIN: ICD-10-CM

## 2023-08-22 DIAGNOSIS — M54.6 ACUTE LEFT-SIDED THORACIC BACK PAIN: Primary | ICD-10-CM

## 2023-08-22 LAB
ALBUMIN UR-MCNC: NEGATIVE MG/DL
APPEARANCE UR: CLEAR
BACTERIA #/AREA URNS HPF: ABNORMAL /HPF
BASOPHILS # BLD AUTO: 0 10E3/UL (ref 0–0.2)
BASOPHILS NFR BLD AUTO: 1 %
BILIRUB UR QL STRIP: NEGATIVE
COLOR UR AUTO: YELLOW
EOSINOPHIL # BLD AUTO: 0.2 10E3/UL (ref 0–0.7)
EOSINOPHIL NFR BLD AUTO: 4 %
ERYTHROCYTE [DISTWIDTH] IN BLOOD BY AUTOMATED COUNT: 14.7 % (ref 10–15)
GLUCOSE UR STRIP-MCNC: NEGATIVE MG/DL
HCT VFR BLD AUTO: 40.1 % (ref 35–47)
HGB BLD-MCNC: 12.3 G/DL (ref 11.7–15.7)
HGB UR QL STRIP: ABNORMAL
HOLD SPECIMEN: NORMAL
IMM GRANULOCYTES # BLD: 0 10E3/UL
IMM GRANULOCYTES NFR BLD: 0 %
KETONES UR STRIP-MCNC: NEGATIVE MG/DL
LEUKOCYTE ESTERASE UR QL STRIP: ABNORMAL
LYMPHOCYTES # BLD AUTO: 1.6 10E3/UL (ref 0.8–5.3)
LYMPHOCYTES NFR BLD AUTO: 25 %
MCH RBC QN AUTO: 25.7 PG (ref 26.5–33)
MCHC RBC AUTO-ENTMCNC: 30.7 G/DL (ref 31.5–36.5)
MCV RBC AUTO: 84 FL (ref 78–100)
MONOCYTES # BLD AUTO: 0.5 10E3/UL (ref 0–1.3)
MONOCYTES NFR BLD AUTO: 8 %
MUCOUS THREADS #/AREA URNS LPF: PRESENT /LPF
NEUTROPHILS # BLD AUTO: 4 10E3/UL (ref 1.6–8.3)
NEUTROPHILS NFR BLD AUTO: 63 %
NITRATE UR QL: NEGATIVE
PH UR STRIP: 6 [PH] (ref 5–7)
PLATELET # BLD AUTO: 285 10E3/UL (ref 150–450)
RBC # BLD AUTO: 4.79 10E6/UL (ref 3.8–5.2)
RBC #/AREA URNS AUTO: ABNORMAL /HPF
SP GR UR STRIP: 1.01 (ref 1–1.03)
SQUAMOUS #/AREA URNS AUTO: ABNORMAL /LPF
UROBILINOGEN UR STRIP-ACNC: 1 E.U./DL
WBC # BLD AUTO: 6.4 10E3/UL (ref 4–11)
WBC #/AREA URNS AUTO: ABNORMAL /HPF

## 2023-08-22 PROCEDURE — 99214 OFFICE O/P EST MOD 30 MIN: CPT | Performed by: NURSE PRACTITIONER

## 2023-08-22 PROCEDURE — 81003 URINALYSIS AUTO W/O SCOPE: CPT | Mod: ZL | Performed by: NURSE PRACTITIONER

## 2023-08-22 PROCEDURE — 85025 COMPLETE CBC W/AUTO DIFF WBC: CPT | Mod: ZL | Performed by: NURSE PRACTITIONER

## 2023-08-22 PROCEDURE — 74019 RADEX ABDOMEN 2 VIEWS: CPT | Mod: TC,FY

## 2023-08-22 PROCEDURE — 81001 URINALYSIS AUTO W/SCOPE: CPT | Mod: ZL | Performed by: NURSE PRACTITIONER

## 2023-08-22 PROCEDURE — 36415 COLL VENOUS BLD VENIPUNCTURE: CPT | Mod: ZL | Performed by: NURSE PRACTITIONER

## 2023-08-22 PROCEDURE — G0463 HOSPITAL OUTPT CLINIC VISIT: HCPCS

## 2023-08-22 RX ORDER — PREDNISONE 20 MG/1
20 TABLET ORAL 2 TIMES DAILY
Qty: 10 TABLET | Refills: 0 | Status: SHIPPED | OUTPATIENT
Start: 2023-08-22 | End: 2023-08-27

## 2023-08-22 RX ORDER — CYCLOBENZAPRINE HCL 10 MG
10 TABLET ORAL 3 TIMES DAILY PRN
Qty: 60 TABLET | Refills: 1 | Status: SHIPPED | OUTPATIENT
Start: 2023-08-22 | End: 2023-10-04

## 2023-08-22 ASSESSMENT — PAIN SCALES - GENERAL: PAINLEVEL: MODERATE PAIN (5)

## 2023-08-22 NOTE — PROGRESS NOTES
Assessment & Plan     1. Acute left-sided thoracic back pain  - XR ABDOMEN 2 VW (Clinic Performed); Future - no stone noted.  - CBC with Platelets & Differential - normal  - UA Macroscopic with reflex to Microscopic and Culture - normal, likely friend of kidney stone very small.  Will treat as acute back pain with muscle spasm.  If no improvement will order CT.   - UA Microscopic with Reflex to Culture  - predniSONE (DELTASONE) 20 MG tablet; Take 1 tablet (20 mg) by mouth 2 times daily for 5 days  Dispense: 10 tablet; Refill: 0  - cyclobenzaprine (FLEXERIL) 10 MG tablet; Take 1 tablet (10 mg) by mouth 3 times daily as needed for muscle spasms  Dispense: 60 tablet; Refill: 1    2. Spasm of thoracic back muscle  As above  - cyclobenzaprine (FLEXERIL) 10 MG tablet; Take 1 tablet (10 mg) by mouth 3 times daily as needed for muscle spasms  Dispense: 60 tablet; Refill: 1      Follow-up as needed or if no improvement.      Amanda Meehan NP  [facility]    Myrna Lee is a 68 year old, presenting for the following health issues:  Back Pain      HPI     Pain History:  When did you first notice your pain? Couple weeks    Have you seen anyone else for your pain? No  How has your pain affected your ability to work? Still works muddles through the day   Where in your body do you have pain? Back Pain  Onset/Duration: couple weeks   Description:   Location of pain: middle of back left side that radiations around to abdomen into groin.    Character of pain: sharp, stabbing, gnawing, burning, and cramping  Pain radiation: to left side of abdomen   New numbness or weakness in legs, not attributed to pain: no   Intensity: Currently 5/10  Progression of Symptoms: worsening feels like takes breath away at times with pain   History:   Specific cause: none  Pain interferes with job: YES  History of back problems: no prior back problems  Any previous MRI or X-rays: None  Sees a specialist for back  "pain: No  Alleviating factors:   Improved by: nothing     Precipitating factors:  Worsened by: Lifting, Bending, Standing, Sitting, Lying Flat, and Walking  Therapies tried and outcome: acetaminophen (Tylenol), cold, heat, massage, and NSAIDs        Recent Labs   Lab Test 06/16/23  0824 03/09/23  1104 02/13/23  1402 10/21/22  1108 07/27/19  1353   0000   A1C 6.3* 6.3*  --  6.1*  --   --    LDL 65 142*  --  75  --   --    HDL 60 52  --  56  --   --    TRIG 93 134  --  104  --   --    ALT 11 11  --  13 14  --    CR 0.80 0.84   < > 0.84 0.82  --    GFRESTIMATED 80 76   < > 76 76  --    GFRESTBLACK  --   --   --   --  88  --    POTASSIUM 4.4 4.6   < > 4.3 3.9  --    TSH 1.27 1.28  --  1.16  --    < >    < > = values in this interval not displayed.      BP Readings from Last 3 Encounters:   08/22/23 118/80   06/21/23 138/80   05/18/23 122/74    Wt Readings from Last 3 Encounters:   08/22/23 122.8 kg (270 lb 11.2 oz)   06/21/23 124.1 kg (273 lb 8 oz)   05/18/23 124.5 kg (274 lb 6.4 oz)                        Review of Systems   Constitutional, HEENT, cardiovascular, pulmonary, gi and gu systems are negative, except as otherwise noted.      Objective    /80 (BP Location: Right arm, Patient Position: Chair, Cuff Size: Adult Large)   Pulse 62   Temp 97.7  F (36.5  C) (Tympanic)   Resp 18   Ht 1.626 m (5' 4\")   Wt 122.8 kg (270 lb 11.2 oz)   SpO2 99%   BMI 46.47 kg/m    Body mass index is 46.47 kg/m .  Physical Exam   GENERAL: alert, no distress, and obese  MS: no gross musculoskeletal defects noted, no edema  Comprehensive back pain exam:  Tenderness of left thoracic spine, Pain limits the following motions: bending and twisting, Lower extremity sensation normal and equal on both sides, and Straight leg raise negative bilaterally  PSYCH: mentation appears normal, affect normal/bright        Results for orders placed or performed in visit on 08/22/23   XR ABDOMEN 2 VW (Clinic Performed)     Status: None    " Narrative    PROCEDURE:  XR ABDOMEN 2 VIEWS    HISTORY:  Acute left-sided thoracic back pain    TECHNIQUE:  AP upright and supine radiographs of the abdomen.    COMPARISON:  None.    FINDINGS:     The lung bases are clear. No subdiaphragmatic air is seen.    No dilated loops of small bowel or air-fluid levels are seen.    Scattered degenerative changes are seen in the spine and SI joints.      Impression    IMPRESSION:    No obstruction or free air.    MAXIMILIANO DICKENS MD         SYSTEM ID:  [id number]   Results for orders placed or performed in visit on 08/22/23   Extra Tube     Status: None    Narrative    The following orders were created for panel order Extra Tube.  Procedure                               Abnormality         Status                     ---------                               -----------         ------                     Extra Serum Separator Tu...[[id number]]                      Final result                 Please view results for these tests on the individual orders.   CBC with platelets and differential     Status: Abnormal   Result Value Ref Range    WBC Count 6.4 4.0 - 11.0 10e3/uL    RBC Count 4.79 3.80 - 5.20 10e6/uL    Hemoglobin 12.3 11.7 - 15.7 g/dL    Hematocrit 40.1 35.0 - 47.0 %    MCV 84 78 - 100 fL    MCH 25.7 (L) 26.5 - 33.0 pg    MCHC 30.7 (L) 31.5 - 36.5 g/dL    RDW 14.7 10.0 - 15.0 %    Platelet Count 285 150 - 450 10e3/uL    % Neutrophils 63 %    % Lymphocytes 25 %    % Monocytes 8 %    % Eosinophils 4 %    % Basophils 1 %    % Immature Granulocytes 0 %    Absolute Neutrophils 4.0 1.6 - 8.3 10e3/uL    Absolute Lymphocytes 1.6 0.8 - 5.3 10e3/uL    Absolute Monocytes 0.5 0.0 - 1.3 10e3/uL    Absolute Eosinophils 0.2 0.0 - 0.7 10e3/uL    Absolute Basophils 0.0 0.0 - 0.2 10e3/uL    Absolute Immature Granulocytes 0.0 <=0.4 10e3/uL   Extra Serum Separator Tube (SST)     Status: None   Result Value Ref Range    Hold Specimen JIC    UA Macroscopic with reflex to Microscopic and  Culture     Status: Abnormal    Specimen: Urine, Midstream   Result Value Ref Range    Color Urine Yellow Colorless, Straw, Light Yellow, Yellow    Appearance Urine Clear Clear    Glucose Urine Negative Negative mg/dL    Bilirubin Urine Negative Negative    Ketones Urine Negative Negative mg/dL    Specific Gravity Urine 1.015 1.003 - 1.035    Blood Urine Trace (A) Negative    pH Urine 6.0 5.0 - 7.0    Protein Albumin Urine Negative Negative mg/dL    Urobilinogen Urine 1.0 0.2, 1.0 E.U./dL    Nitrite Urine Negative Negative    Leukocyte Esterase Urine Trace (A) Negative   UA Microscopic with Reflex to Culture     Status: Abnormal   Result Value Ref Range    Bacteria Urine Few (A) None Seen /HPF    RBC Urine 0-2 0-2 /HPF /HPF    WBC Urine 0-5 0-5 /HPF /HPF    Squamous Epithelials Urine Few (A) None Seen /LPF    Mucus Urine Present (A) None Seen /LPF    Narrative    Urine Culture not indicated   CBC with Platelets & Differential     Status: Abnormal    Narrative    The following orders were created for panel order CBC with Platelets & Differential.  Procedure                               Abnormality         Status                     ---------                               -----------         ------                     CBC with platelets and d...[[id number]]  Abnormal            Final result                 Please view results for these tests on the individual orders.

## 2023-08-23 DIAGNOSIS — G62.9 PERIPHERAL POLYNEUROPATHY: ICD-10-CM

## 2023-08-23 DIAGNOSIS — F41.1 GENERALIZED ANXIETY DISORDER: ICD-10-CM

## 2023-08-23 DIAGNOSIS — F33.0 MILD RECURRENT MAJOR DEPRESSION (H): ICD-10-CM

## 2023-08-24 RX ORDER — DULOXETIN HYDROCHLORIDE 60 MG/1
60 CAPSULE, DELAYED RELEASE ORAL AT BEDTIME
Qty: 30 CAPSULE | Refills: 4 | Status: SHIPPED | OUTPATIENT
Start: 2023-08-24 | End: 2024-01-31

## 2023-08-24 RX ORDER — GABAPENTIN 100 MG/1
100 CAPSULE ORAL 3 TIMES DAILY
Qty: 90 CAPSULE | Refills: 1 | Status: SHIPPED | OUTPATIENT
Start: 2023-08-24 | End: 2023-11-29

## 2023-08-24 NOTE — TELEPHONE ENCOUNTER
gabapentin (NEURONTIN) 100 MG capsule       Last Written Prescription Date:  6/28/2023  Last Fill Quantity: 90,   # refills: 1  Last Office Visit: 8/22/2023    DULoxetine (CYMBALTA) 60 MG capsule       Last Written Prescription Date:  7/17/2023  Last Fill Quantity: 30,   # refills: 1

## 2023-09-20 ENCOUNTER — LAB (OUTPATIENT)
Dept: LAB | Facility: OTHER | Age: 68
End: 2023-09-20
Payer: MEDICARE

## 2023-09-20 DIAGNOSIS — I10 ESSENTIAL HYPERTENSION: ICD-10-CM

## 2023-09-20 DIAGNOSIS — E78.5 HYPERLIPIDEMIA LDL GOAL <100: ICD-10-CM

## 2023-09-20 DIAGNOSIS — R73.03 PRE-DIABETES: ICD-10-CM

## 2023-09-20 DIAGNOSIS — Z79.899 ON STATIN THERAPY: ICD-10-CM

## 2023-09-20 LAB
ALBUMIN SERPL BCG-MCNC: 4 G/DL (ref 3.5–5.2)
ALP SERPL-CCNC: 103 U/L (ref 35–104)
ALT SERPL W P-5'-P-CCNC: 15 U/L (ref 0–50)
ANION GAP SERPL CALCULATED.3IONS-SCNC: 13 MMOL/L (ref 7–15)
AST SERPL W P-5'-P-CCNC: 19 U/L (ref 0–45)
BILIRUB SERPL-MCNC: 0.9 MG/DL
BUN SERPL-MCNC: 11.6 MG/DL (ref 8–23)
CALCIUM SERPL-MCNC: 8.9 MG/DL (ref 8.8–10.2)
CHLORIDE SERPL-SCNC: 103 MMOL/L (ref 98–107)
CHOLEST SERPL-MCNC: 171 MG/DL
CREAT SERPL-MCNC: 0.92 MG/DL (ref 0.51–0.95)
DEPRECATED HCO3 PLAS-SCNC: 27 MMOL/L (ref 22–29)
EGFRCR SERPLBLD CKD-EPI 2021: 67 ML/MIN/1.73M2
EST. AVERAGE GLUCOSE BLD GHB EST-MCNC: 137 MG/DL
GLUCOSE SERPL-MCNC: 98 MG/DL (ref 70–99)
HBA1C MFR BLD: 6.4 %
HDLC SERPL-MCNC: 56 MG/DL
LDLC SERPL CALC-MCNC: 91 MG/DL
NONHDLC SERPL-MCNC: 115 MG/DL
POTASSIUM SERPL-SCNC: 4.2 MMOL/L (ref 3.4–5.3)
PROT SERPL-MCNC: 6.6 G/DL (ref 6.4–8.3)
SODIUM SERPL-SCNC: 143 MMOL/L (ref 136–145)
TRIGL SERPL-MCNC: 118 MG/DL
TSH SERPL DL<=0.005 MIU/L-ACNC: 1.99 UIU/ML (ref 0.3–4.2)

## 2023-09-20 PROCEDURE — 84443 ASSAY THYROID STIM HORMONE: CPT | Mod: ZL

## 2023-09-20 PROCEDURE — 82310 ASSAY OF CALCIUM: CPT | Mod: ZL

## 2023-09-20 PROCEDURE — 83036 HEMOGLOBIN GLYCOSYLATED A1C: CPT | Mod: ZL

## 2023-09-20 PROCEDURE — 36415 COLL VENOUS BLD VENIPUNCTURE: CPT | Mod: ZL

## 2023-09-20 PROCEDURE — 80061 LIPID PANEL: CPT | Mod: ZL

## 2023-09-21 ENCOUNTER — TRANSFERRED RECORDS (OUTPATIENT)
Dept: HEALTH INFORMATION MANAGEMENT | Facility: CLINIC | Age: 68
End: 2023-09-21

## 2023-09-21 LAB — RETINOPATHY: NEGATIVE

## 2023-10-03 ENCOUNTER — TELEPHONE (OUTPATIENT)
Dept: FAMILY MEDICINE | Facility: OTHER | Age: 68
End: 2023-10-03

## 2023-10-03 NOTE — TELEPHONE ENCOUNTER
8:38 AM    Reason for Call: OVERBOOK    Patient is having the following symptoms: KIDNEY PAIN ON LT SIDE - FROM BACK AROUND TO FRONT - HAS BEEN IN TO SEE STEPHEN AND IT ISN'T GETTING BETTER for 1 months.    The patient is requesting an appointment for ASAP with ANY PROVIDER.    Was an appointment offered for this call? No  If yes : Appointment type              Date    Preferred method for responding to this message: Telephone Call  What is your phone number ? [phone]     If we cannot reach you directly, may we leave a detailed response at the number you provided? Yes    Can this message wait until your PCP/provider returns, if unavailable today? Venus,     Azucena Roth

## 2023-10-03 NOTE — PROGRESS NOTES
"  Assessment & Plan     1. Acute cystitis with hematuria  Increase fluids  - sulfamethoxazole-trimethoprim (BACTRIM DS) 800-160 MG tablet; Take 1 tablet by mouth 2 times daily for 7 days  Dispense: 14 tablet; Refill: 0    2. Chronic left-sided low back pain with left-sided sciatica  Ice, stretching.  MRI ordered due to worsening symptoms.  CT scan reviewed.   - cyclobenzaprine (FLEXERIL) 10 MG tablet; Take 1 tablet (10 mg) by mouth 3 times daily as needed for muscle spasms  Dispense: 60 tablet; Refill: 1  - MR Lumbar Spine w/o Contrast; Future    3. Flank pain  - CBC with platelets and differential  - UA Macroscopic with reflex to Microscopic and Culture   - Urine Microscopic Exam  - Urine Culture - pending.            BMI:   Estimated body mass index is 46.64 kg/m  as calculated from the following:    Height as of this encounter: 1.626 m (5' 4\").    Weight as of this encounter: 123.2 kg (271 lb 11.2 oz).       Follow-up as needed  Will notify of results as they are returned.     Amanda Meehan, NP  [facility]    Myrna Lee is a 68 year old, presenting for the following health issues:  Flank Pain        10/4/2023     8:07 AM   Additional Questions   Roomed by Jelena VARGAS LPN   Accompanied by self       HPI     Pain History:  When did you first notice your pain? Over 6 weeks ago   Have you seen this provider for your pain in the past? Yes   Where in your body do you have pain? Low back/kidneys - radiating to the front\"wrapping around\".  Has had intermittent chills but denies fever.    Are you seeing anyone else for your pain? No        3/9/2023    10:00 AM 5/18/2023    11:01 AM 6/21/2023    10:14 AM   PHQ-9 SCORE   PHQ-9 Total Score MyChart  6 (Mild depression)    PHQ-9 Total Score 2 6 1           10/21/2022    10:00 AM 3/9/2023    10:00 AM 5/18/2023    11:03 AM   YAZ-7 SCORE   Total Score   4 (minimal anxiety)   Total Score 12 0 4           Chronic Pain Follow Up:    Location " "of pain: low back - kidneys  Analgesia/pain control:    - Recent changes:  intermittent - burning feeling inside    - Overall control: Comfortably manageable - but not for long period of time   - Current treatments: FLEXERIL - heat and ice packs   Adherence:     - Do you ever take more pain medicine than prescribed? No    - When did you take your last dose of pain medicine?  Tylenol last night - out of Flexeril   Adverse effects: Yes- tiredness  PDMP Review         Value Time User    State PDMP site checked  Yes 2/13/2023  1:01 PM Edith Drummond CNP          Last CSA Agreement:   CSA -- Patient Level:    CSA: None found at the patient level.       Last UDS:     NA          Recent Labs   Lab Test 09/20/23  0816 06/16/23  0824 03/09/23  1104 10/21/22  1108 07/27/19  1353   A1C 6.4* 6.3* 6.3*   < >  --    LDL 91 65 142*   < >  --    HDL 56 60 52   < >  --    TRIG 118 93 134   < >  --    ALT 15 11 11   < > 14   CR 0.92 0.80 0.84   < > 0.82   GFRESTIMATED 67 80 76   < > 76   GFRESTBLACK  --   --   --   --  88   POTASSIUM 4.2 4.4 4.6   < > 3.9   TSH 1.99 1.27 1.28   < >  --     < > = values in this interval not displayed.      BP Readings from Last 3 Encounters:   10/04/23 130/80   08/22/23 118/80   06/21/23 138/80    Wt Readings from Last 3 Encounters:   10/04/23 123.2 kg (271 lb 11.2 oz)   08/22/23 122.8 kg (270 lb 11.2 oz)   06/21/23 124.1 kg (273 lb 8 oz)                        Review of Systems   Constitutional, HEENT, cardiovascular, pulmonary, gi and gu systems are negative, except as otherwise noted.      Objective    /80 (BP Location: Left arm, Patient Position: Sitting, Cuff Size: Adult Regular)   Pulse 60   Temp 97  F (36.1  C) (Tympanic)   Ht 1.626 m (5' 4\")   Wt 123.2 kg (271 lb 11.2 oz)   SpO2 96%   BMI 46.64 kg/m    Body mass index is 46.64 kg/m .  Physical Exam   GENERAL: healthy, alert and no distress  NECK: no adenopathy, no asymmetry, masses, or scars and thyroid normal to " palpation  RESP: lungs clear to auscultation - no rales, rhonchi or wheezes  CV: regular rate and rhythm, normal S1 S2, no S3 or S4, no murmur, click or rub, no peripheral edema and peripheral pulses strong  ABDOMEN: soft, nontender, no hepatosplenomegaly, no masses and bowel sounds normal  MS: no gross musculoskeletal defects noted, no edema        Results for orders placed or performed in visit on 10/04/23   CBC with platelets and differential     Status: Abnormal   Result Value Ref Range    WBC Count 6.5 4.0 - 11.0 10e3/uL    RBC Count 4.73 3.80 - 5.20 10e6/uL    Hemoglobin 12.2 11.7 - 15.7 g/dL    Hematocrit 39.9 35.0 - 47.0 %    MCV 84 78 - 100 fL    MCH 25.8 (L) 26.5 - 33.0 pg    MCHC 30.6 (L) 31.5 - 36.5 g/dL    RDW 14.8 10.0 - 15.0 %    Platelet Count 311 150 - 450 10e3/uL    % Neutrophils 57 %    % Lymphocytes 29 %    % Monocytes 9 %    Mids % (Monos, Eos, Basos)      % Eosinophils 5 %    % Basophils 1 %    % Immature Granulocytes 0 %    Absolute Neutrophils 3.7 1.6 - 8.3 10e3/uL    Absolute Lymphocytes 1.9 0.8 - 5.3 10e3/uL    Absolute Monocytes 0.6 0.0 - 1.3 10e3/uL    Mids Abs (Monos, Eos, Basos)      Absolute Eosinophils 0.3 0.0 - 0.7 10e3/uL    Absolute Basophils 0.0 0.0 - 0.2 10e3/uL    Absolute Immature Granulocytes 0.0 <=0.4 10e3/uL   Extra Tube     Status: None    Narrative    The following orders were created for panel order Extra Tube.  Procedure                               Abnormality         Status                     ---------                               -----------         ------                     Extra Serum Separator Tu...[[id number]]                      Final result                 Please view results for these tests on the individual orders.   Extra Serum Separator Tube (SST)     Status: None   Result Value Ref Range    Hold Specimen JIC    UA Macroscopic with reflex to Microscopic and Culture     Status: Abnormal    Specimen: Urine, Midstream   Result Value Ref Range    Color Urine  Yellow Colorless, Straw, Light Yellow, Yellow    Appearance Urine Clear Clear    Glucose Urine Negative Negative mg/dL    Bilirubin Urine Negative Negative    Ketones Urine Negative Negative mg/dL    Specific Gravity Urine 1.025 1.003 - 1.035    Blood Urine Small (A) Negative    pH Urine 6.0 5.0 - 7.0    Protein Albumin Urine Negative Negative mg/dL    Urobilinogen Urine 1.0 0.2, 1.0 E.U./dL    Nitrite Urine Negative Negative    Leukocyte Esterase Urine Moderate (A) Negative   Urine Microscopic Exam     Status: Abnormal   Result Value Ref Range    Bacteria Urine Moderate (A) None Seen /HPF    RBC Urine 5-10 (A) 0-2 /HPF /HPF    WBC Urine 10-25 (A) 0-5 /HPF /HPF    Squamous Epithelials Urine Few (A) None Seen /LPF    WBC Clumps Urine Present (A) None Seen /HPF    Mucus Urine Present (A) None Seen /LPF   CBC with platelets and differential     Status: Abnormal    Narrative    The following orders were created for panel order CBC with platelets and differential.  Procedure                               Abnormality         Status                     ---------                               -----------         ------                     CBC with platelets and d...[[id number]]  Abnormal            Final result                 Please view results for these tests on the individual orders.

## 2023-10-04 ENCOUNTER — OFFICE VISIT (OUTPATIENT)
Dept: FAMILY MEDICINE | Facility: OTHER | Age: 68
End: 2023-10-04
Attending: NURSE PRACTITIONER
Payer: MEDICARE

## 2023-10-04 VITALS
WEIGHT: 271.7 LBS | DIASTOLIC BLOOD PRESSURE: 80 MMHG | TEMPERATURE: 97 F | OXYGEN SATURATION: 96 % | HEIGHT: 64 IN | SYSTOLIC BLOOD PRESSURE: 130 MMHG | BODY MASS INDEX: 46.38 KG/M2 | HEART RATE: 60 BPM

## 2023-10-04 DIAGNOSIS — G89.29 CHRONIC LEFT-SIDED LOW BACK PAIN WITH LEFT-SIDED SCIATICA: ICD-10-CM

## 2023-10-04 DIAGNOSIS — M54.42 CHRONIC LEFT-SIDED LOW BACK PAIN WITH LEFT-SIDED SCIATICA: ICD-10-CM

## 2023-10-04 DIAGNOSIS — R10.9 FLANK PAIN: ICD-10-CM

## 2023-10-04 DIAGNOSIS — N30.01 ACUTE CYSTITIS WITH HEMATURIA: Primary | ICD-10-CM

## 2023-10-04 LAB
ALBUMIN UR-MCNC: NEGATIVE MG/DL
APPEARANCE UR: CLEAR
BACTERIA #/AREA URNS HPF: ABNORMAL /HPF
BASO+EOS+MONOS # BLD AUTO: ABNORMAL 10*3/UL
BASO+EOS+MONOS NFR BLD AUTO: ABNORMAL %
BASOPHILS # BLD AUTO: 0 10E3/UL (ref 0–0.2)
BASOPHILS NFR BLD AUTO: 1 %
BILIRUB UR QL STRIP: NEGATIVE
COLOR UR AUTO: YELLOW
EOSINOPHIL # BLD AUTO: 0.3 10E3/UL (ref 0–0.7)
EOSINOPHIL NFR BLD AUTO: 5 %
ERYTHROCYTE [DISTWIDTH] IN BLOOD BY AUTOMATED COUNT: 14.8 % (ref 10–15)
GLUCOSE UR STRIP-MCNC: NEGATIVE MG/DL
HCT VFR BLD AUTO: 39.9 % (ref 35–47)
HGB BLD-MCNC: 12.2 G/DL (ref 11.7–15.7)
HGB UR QL STRIP: ABNORMAL
HOLD SPECIMEN: NORMAL
IMM GRANULOCYTES # BLD: 0 10E3/UL
IMM GRANULOCYTES NFR BLD: 0 %
KETONES UR STRIP-MCNC: NEGATIVE MG/DL
LEUKOCYTE ESTERASE UR QL STRIP: ABNORMAL
LYMPHOCYTES # BLD AUTO: 1.9 10E3/UL (ref 0.8–5.3)
LYMPHOCYTES NFR BLD AUTO: 29 %
MCH RBC QN AUTO: 25.8 PG (ref 26.5–33)
MCHC RBC AUTO-ENTMCNC: 30.6 G/DL (ref 31.5–36.5)
MCV RBC AUTO: 84 FL (ref 78–100)
MONOCYTES # BLD AUTO: 0.6 10E3/UL (ref 0–1.3)
MONOCYTES NFR BLD AUTO: 9 %
MUCOUS THREADS #/AREA URNS LPF: PRESENT /LPF
NEUTROPHILS # BLD AUTO: 3.7 10E3/UL (ref 1.6–8.3)
NEUTROPHILS NFR BLD AUTO: 57 %
NITRATE UR QL: NEGATIVE
PH UR STRIP: 6 [PH] (ref 5–7)
PLATELET # BLD AUTO: 311 10E3/UL (ref 150–450)
RBC # BLD AUTO: 4.73 10E6/UL (ref 3.8–5.2)
RBC #/AREA URNS AUTO: ABNORMAL /HPF
SP GR UR STRIP: 1.02 (ref 1–1.03)
SQUAMOUS #/AREA URNS AUTO: ABNORMAL /LPF
UROBILINOGEN UR STRIP-ACNC: 1 E.U./DL
WBC # BLD AUTO: 6.5 10E3/UL (ref 4–11)
WBC #/AREA URNS AUTO: ABNORMAL /HPF
WBC CLUMPS #/AREA URNS HPF: PRESENT /HPF

## 2023-10-04 PROCEDURE — 99214 OFFICE O/P EST MOD 30 MIN: CPT | Performed by: NURSE PRACTITIONER

## 2023-10-04 PROCEDURE — 81001 URINALYSIS AUTO W/SCOPE: CPT | Mod: ZL | Performed by: NURSE PRACTITIONER

## 2023-10-04 PROCEDURE — 87086 URINE CULTURE/COLONY COUNT: CPT | Mod: ZL | Performed by: NURSE PRACTITIONER

## 2023-10-04 PROCEDURE — 85025 COMPLETE CBC W/AUTO DIFF WBC: CPT | Mod: ZL | Performed by: NURSE PRACTITIONER

## 2023-10-04 PROCEDURE — 81003 URINALYSIS AUTO W/O SCOPE: CPT | Mod: ZL | Performed by: NURSE PRACTITIONER

## 2023-10-04 PROCEDURE — G0463 HOSPITAL OUTPT CLINIC VISIT: HCPCS | Mod: 25

## 2023-10-04 PROCEDURE — 36415 COLL VENOUS BLD VENIPUNCTURE: CPT | Mod: ZL | Performed by: NURSE PRACTITIONER

## 2023-10-04 RX ORDER — SULFAMETHOXAZOLE/TRIMETHOPRIM 800-160 MG
1 TABLET ORAL 2 TIMES DAILY
Qty: 14 TABLET | Refills: 0 | Status: SHIPPED | OUTPATIENT
Start: 2023-10-04 | End: 2023-10-11

## 2023-10-04 RX ORDER — CYCLOBENZAPRINE HCL 10 MG
10 TABLET ORAL 3 TIMES DAILY PRN
Qty: 60 TABLET | Refills: 1 | Status: SHIPPED | OUTPATIENT
Start: 2023-10-04 | End: 2023-12-12

## 2023-10-04 ASSESSMENT — PAIN SCALES - GENERAL: PAINLEVEL: MODERATE PAIN (5)

## 2023-10-05 LAB — BACTERIA UR CULT: NORMAL

## 2023-10-13 ENCOUNTER — TELEPHONE (OUTPATIENT)
Dept: FAMILY MEDICINE | Facility: OTHER | Age: 68
End: 2023-10-13

## 2023-10-18 ENCOUNTER — HOSPITAL ENCOUNTER (OUTPATIENT)
Dept: MRI IMAGING | Facility: HOSPITAL | Age: 68
Discharge: HOME | End: 2023-10-18
Attending: NURSE PRACTITIONER | Admitting: NURSE PRACTITIONER
Payer: MEDICARE

## 2023-10-18 DIAGNOSIS — G89.29 CHRONIC LEFT-SIDED LOW BACK PAIN WITH LEFT-SIDED SCIATICA: ICD-10-CM

## 2023-10-18 DIAGNOSIS — M54.42 CHRONIC LEFT-SIDED LOW BACK PAIN WITH LEFT-SIDED SCIATICA: ICD-10-CM

## 2023-10-18 PROCEDURE — G1010 CDSM STANSON: HCPCS

## 2023-11-21 DIAGNOSIS — G62.9 PERIPHERAL POLYNEUROPATHY: ICD-10-CM

## 2023-11-22 NOTE — TELEPHONE ENCOUNTER
gabapentin (NEURONTIN) 100 MG capsule 90 capsule 1 8/24/2023     Last Office Visit: 10/04/2023  Future Office visit:       Routing refill request to provider for review/approval because:

## 2023-11-29 RX ORDER — GABAPENTIN 100 MG/1
CAPSULE ORAL
Qty: 90 CAPSULE | Refills: 1 | Status: SHIPPED | OUTPATIENT
Start: 2023-11-29 | End: 2024-02-19

## 2023-12-11 ENCOUNTER — NURSE TRIAGE (OUTPATIENT)
Dept: FAMILY MEDICINE | Facility: OTHER | Age: 68
End: 2023-12-11

## 2023-12-11 NOTE — TELEPHONE ENCOUNTER
"Reason for Disposition   Dizziness and lightheadedness (e.g., feeling woozy, feeling like they might faint)   [1] MILD dizziness (e.g., walking normally) AND [2] has NOT been evaluated by doctor (or NP/PA) for this  (Exception: Dizziness caused by heat exposure, sudden standing, or poor fluid intake.)    Answer Assessment - Initial Assessment Questions  1. ONSET: \"When did the sweating start?\"       Comes and goes for the past month  2. LOCATION: \"What part of your body has excessive sweating?\" (e.g., entire body; just face, underarms, palms, or soles of feet).       Entire body  3. SEVERITY: \"How bad is the sweating?\"    (Scale 1-10; or mild, moderate, severe)    -  MILD (1-3): Sweating doesn't interfere with normal activities.     -  MODERATE (4-7): Sweating interferes with normal activities (e.g., work or school) or awakens from sleep; causes embarrassment in social situations.     -  SEVERE (8-10): Drenching sweat and has to change bed clothes or bed linens.    - NIGHT SWEATS: Drenching sweat that occurs at night and has to change bed clothes or bed sheets.      8/10  4. CAUSE: \"What do you think is causing the sweating?\"      unknown  5. FEVER: \"Have you been having fevers?\" If Yes, ask: \"What is your temperature, how was it measured, and when did it start?\"      no  6. OTHER SYMPTOMS: \"Do you have any other symptoms?\" (e.g., chest pain, difficulty breathing, lightheadedness, weight loss)      Sometimes feel's like passing out, sometimes sob, chest pain last Wednesday, headache, pain in her left arm,    Protocols used: Gblepent-W-MS, Dizziness - [id number]    "

## 2023-12-11 NOTE — PROGRESS NOTES
"    Assessment & Plan     Acute chest pain  No chest pain or SOB today.   However, due to worsening DORAN intermittent chest pain, I am having Marquis do an ECHO, stress test, and follow up with her cardiology team at [facility]. Lengthy discussion on when to go into ED/ER (any return of chest pain shortness of breath not typical for you, severe sweating, or arm pain).   - EKG 12-lead complete w/read - (Clinic Performed)    Shortness of breath  - EKG 12-lead complete w/read - (Clinic Performed)  - Echocardiogram Complete; Future  - NM Lexiscan stress test; Future  - Adult Cardiology Eval  Referral    Impaired fasting glucose  Noted. Has stopped metformin. Not due for A1C yet. Advised to follow up with primary provider to discuss permanently stopping or restarting metformin.     History of chest pain  - Adult Cardiology Eval  Referral    Nonintractable episodic headache, unspecified headache type  Go into ED/ER if severe or not relieved by OTC medications.      Loose Stools  Restart the gabapentin at least daily. Stopping abruptly can cause GI symptoms and may be contributing to your diarrhea.         Follow up in 1 month with Tapan Sauer to discuss metformin and gabapentin.     Review of external notes as documented elsewhere in note  Ordering of each unique test  Prescription drug management  No LOS data to display   Time spent by me doing chart review, history and exam, documentation and further activities per the note       BMI:   Estimated body mass index is 45.09 kg/m  as calculated from the following:    Height as of this encounter: 1.626 m (5' 4\").    Weight as of this encounter: 119.2 kg (262 lb 11.2 oz).   Weight management plan: Discussed healthy diet and exercise guidelines        Return in about 1 month (around 1/12/2024), or if symptoms worsen or fail to improve.    Edith Drummond, [facility] [place] JOE Lee is a 68 year old, presenting for the following " health issues:  Headache and Nausea        12/12/2023    10:58 AM   Additional Questions   Roomed by michelle   Accompanied by none       HPI     Pain History:  Rosa has concerns with very soft to loose stools this past month intermittently, blurred vision, headaches (always present but sometimes severe-bilateral top of forehead), and chest pain (starts mid sternal and radiates to left arm). No pain currently. Chest pain has not been present since this past Sunday. Associated with dripping sweat and SOB at times. States she was tempted to call 911.    When did you first notice your pain? About a month and worse last week   Have you seen anyone else for your pain? No  How has your pain affected your ability to work? Not applicable  Where in your body do you have pain? Chest Pain  Onset/Duration: last week   Description:   Location: entire chest  Character: tight  Radiation: on left side of arm and face feels numb at times   Duration: few  minutes  5-10 at most   Intensity: starts out mild then increases   Progression of Symptoms: intermittent  Accompanying Signs & Symptoms:  Shortness of breath: YES  Sweating: YES  Nausea/vomiting: YES- nausea feels like wants to vomit   Lightheadedness: YES  Palpitations: YES  Fever/Chills: No  Cough: No           Heartburn: No  History:   Family history of heart disease: YES  Tobacco use: No  Previous similar symptoms: YES- had a loop recorder put in over 5 years ago battery does not work any more was told does not need anymore   Precipitating factors:   Worse with exertion: YES  Worse with deep breaths: No           Related to eating: No           Better with burping: No  Alleviating factors: just goes away on its own   Therapies tried and outcome: nothing      Had negative at home COVID test this past week.       Has followed with  Cardiology. Due for follow up. Known hx of DORAN.     Stress test 10/2/2019 reviewed. See local record for full report.     Narrative &  Impression  This is a Lexiscan Cardiolite study on patient Lala Camejo ordered by  Dr. Hinton and Dr. Manuel for syncope and PSVT.     The patient was placed upon the table using our protocol and given 0.4  mg of IV Lexiscan bolus. She was monitored continuously throughout the  study. Resting heart was 67 heriberto to 93. Resting blood pressure 130/82  went to 124/72. She had transient shortness of breath.     Review of the electrocardiogram shows baseline nonspecific ST-T  changes present throughout the entire study. There were no  arrhythmias.     ASSESSMENT: Lexiscan Cardiolite study which the patient had  appropriate heart rate and blood pressure responses. No acute EKG  changes. No arrhythmias. The Cardiolite scans are pending.     RYAN RIVERA MD    TECHNIQUE: At rest 10 mCi of technetium 99m sestamibi was injected.  The patient was stressed and 30 mCi of technetium 99m sestamibi was  injected.     FINDINGS: On the stress images there was abnormal decreased tracer  uptake at the apex which was reversible on the rest images. The rest  gated images reveal the end diastolic volume to be 105 mL. The  end-systolic volume is 34 mL's. The calculated ejection fraction 68%.                                                                        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 Normal left ventricular  function.     HUANG WATKINS MD        Review of Systems   Constitutional:  Positive for appetite change, diaphoresis and fatigue. Negative for activity change, chills, fever and unexpected weight change.   HENT: Negative.     Eyes:  Positive for visual disturbance.        Intermittent blurry vision. Had eye exam in past month or two.    Cardiovascular:  Positive for chest pain. Negative for peripheral edema.        No chest pain today. Most recently on Sunday, 2 days ago. Worsened by activity.    Gastrointestinal:  Positive for diarrhea and nausea. Negative for vomiting.        Decreased appetite.   "  Endocrine: Negative.    Breasts:  negative.    Genitourinary: Negative.    Neurological:  Negative for dizziness, seizures, syncope and light-headedness.            Objective    BP (!) 140/80 (BP Location: Right arm, Patient Position: Chair, Cuff Size: Adult Large)   Pulse 70   Temp 98  F (36.7  C) (Tympanic)   Resp 16   Ht 1.626 m (5' 4\")   Wt 119.2 kg (262 lb 11.2 oz)   SpO2 99%   BMI 45.09 kg/m    Body mass index is 45.09 kg/m .      Physical Exam   GENERAL: healthy, alert and no distress  EYES: Eyes grossly normal to inspection, PERRL and conjunctivae and sclerae normal  NECK: no adenopathy, no asymmetry, masses, or scars and thyroid normal to palpation  RESP: lungs clear to auscultation - no rales, rhonchi or wheezes  CV: regular rates and rhythm, normal S1 S2, no S3 or S4, no murmur, click or rub, peripheral pulses strong, and no peripheral edema  ABDOMEN: soft, nontender, no hepatosplenomegaly, no masses and bowel sounds normal  NEURO: Normal strength and tone, mentation intact and speech normal  PSYCH: mentation appears normal, affect normal/bright    EKG - Reviewed and interpreted by me appears normal, NSR, normal axis, normal intervals, nonspecific ST abnormality, no LVH by voltage criteria.   Hx of \"reversible ischemia at apex\".                Answers submitted by the patient for this visit:  Patient Health Questionnaire (Submitted on 12/12/2023)  If you checked off any problems, how difficult have these problems made it for you to do your work, take care of things at home, or get along with other people?: Somewhat difficult  PHQ9 TOTAL SCORE: 6  YAZ-7 (Submitted on 12/12/2023)  YAZ 7 TOTAL SCORE: 2                "

## 2023-12-12 ENCOUNTER — OFFICE VISIT (OUTPATIENT)
Dept: FAMILY MEDICINE | Facility: OTHER | Age: 68
End: 2023-12-12
Attending: NURSE PRACTITIONER
Payer: MEDICARE

## 2023-12-12 VITALS
TEMPERATURE: 98 F | HEIGHT: 64 IN | OXYGEN SATURATION: 99 % | DIASTOLIC BLOOD PRESSURE: 80 MMHG | BODY MASS INDEX: 44.85 KG/M2 | HEART RATE: 70 BPM | SYSTOLIC BLOOD PRESSURE: 140 MMHG | RESPIRATION RATE: 16 BRPM | WEIGHT: 262.7 LBS

## 2023-12-12 DIAGNOSIS — Z87.898 HISTORY OF CHEST PAIN: ICD-10-CM

## 2023-12-12 DIAGNOSIS — R51.9 NONINTRACTABLE EPISODIC HEADACHE, UNSPECIFIED HEADACHE TYPE: ICD-10-CM

## 2023-12-12 DIAGNOSIS — R73.01 IMPAIRED FASTING GLUCOSE: ICD-10-CM

## 2023-12-12 DIAGNOSIS — R19.5 LOOSE STOOLS: ICD-10-CM

## 2023-12-12 DIAGNOSIS — R06.02 SHORTNESS OF BREATH: ICD-10-CM

## 2023-12-12 DIAGNOSIS — R07.9 ACUTE CHEST PAIN: Primary | ICD-10-CM

## 2023-12-12 PROCEDURE — 93010 ELECTROCARDIOGRAM REPORT: CPT | Performed by: INTERNAL MEDICINE

## 2023-12-12 PROCEDURE — 93005 ELECTROCARDIOGRAM TRACING: CPT | Performed by: NURSE PRACTITIONER

## 2023-12-12 PROCEDURE — 99215 OFFICE O/P EST HI 40 MIN: CPT | Performed by: NURSE PRACTITIONER

## 2023-12-12 PROCEDURE — G0463 HOSPITAL OUTPT CLINIC VISIT: HCPCS

## 2023-12-12 ASSESSMENT — ANXIETY QUESTIONNAIRES
1. FEELING NERVOUS, ANXIOUS, OR ON EDGE: NOT AT ALL
GAD7 TOTAL SCORE: 2
7. FEELING AFRAID AS IF SOMETHING AWFUL MIGHT HAPPEN: SEVERAL DAYS
4. TROUBLE RELAXING: NOT AT ALL
GAD7 TOTAL SCORE: 2
IF YOU CHECKED OFF ANY PROBLEMS ON THIS QUESTIONNAIRE, HOW DIFFICULT HAVE THESE PROBLEMS MADE IT FOR YOU TO DO YOUR WORK, TAKE CARE OF THINGS AT HOME, OR GET ALONG WITH OTHER PEOPLE: SOMEWHAT DIFFICULT
6. BECOMING EASILY ANNOYED OR IRRITABLE: NOT AT ALL
3. WORRYING TOO MUCH ABOUT DIFFERENT THINGS: NOT AT ALL
2. NOT BEING ABLE TO STOP OR CONTROL WORRYING: NOT AT ALL
5. BEING SO RESTLESS THAT IT IS HARD TO SIT STILL: SEVERAL DAYS

## 2023-12-12 ASSESSMENT — ENCOUNTER SYMPTOMS
CHILLS: 0
DIZZINESS: 0
NAUSEA: 1
VOMITING: 0
DIARRHEA: 1
APPETITE CHANGE: 1
ACTIVITY CHANGE: 0
ROS GI COMMENTS: DECREASED APPETITE.
DIAPHORESIS: 1
LIGHT-HEADEDNESS: 0
FATIGUE: 1
FEVER: 0
UNEXPECTED WEIGHT CHANGE: 0
ENDOCRINE NEGATIVE: 1
SEIZURES: 0

## 2023-12-12 ASSESSMENT — PATIENT HEALTH QUESTIONNAIRE - PHQ9
SUM OF ALL RESPONSES TO PHQ QUESTIONS 1-9: 6
SUM OF ALL RESPONSES TO PHQ QUESTIONS 1-9: 6
10. IF YOU CHECKED OFF ANY PROBLEMS, HOW DIFFICULT HAVE THESE PROBLEMS MADE IT FOR YOU TO DO YOUR WORK, TAKE CARE OF THINGS AT HOME, OR GET ALONG WITH OTHER PEOPLE: SOMEWHAT DIFFICULT

## 2023-12-12 ASSESSMENT — PAIN SCALES - GENERAL: PAINLEVEL: NO PAIN (0)

## 2023-12-12 NOTE — PATIENT INSTRUCTIONS
I strongly advise you to go into the ER if you have any return of chest pain shortness of breath not typical for you, severe sweating, or arm pain.     Follow up with [facility]. I have ordered an ECHO and stress test.     Restart the gabapentin at least daily. Stopping abruptly can cause GI symptoms and may be contributing to your diarrhea.     Follow up in 1 month with Tapan Sauer to discuss metformin and gabapentin.

## 2023-12-13 LAB
ATRIAL RATE - MUSE: 65 BPM
DIASTOLIC BLOOD PRESSURE - MUSE: NORMAL MMHG
INTERPRETATION ECG - MUSE: NORMAL
P AXIS - MUSE: 47 DEGREES
PR INTERVAL - MUSE: 154 MS
QRS DURATION - MUSE: 88 MS
QT - MUSE: 436 MS
QTC - MUSE: 453 MS
R AXIS - MUSE: 13 DEGREES
SYSTOLIC BLOOD PRESSURE - MUSE: NORMAL MMHG
T AXIS - MUSE: 43 DEGREES
VENTRICULAR RATE- MUSE: 65 BPM

## 2023-12-14 ENCOUNTER — HOSPITAL ENCOUNTER (OUTPATIENT)
Dept: CARDIOLOGY | Facility: HOSPITAL | Age: 68
Discharge: HOME | End: 2023-12-14
Attending: NURSE PRACTITIONER | Admitting: INTERNAL MEDICINE
Payer: MEDICARE

## 2023-12-14 ENCOUNTER — TELEPHONE (OUTPATIENT)
Dept: NUCLEAR MEDICINE | Facility: HOSPITAL | Age: 68
End: 2023-12-14

## 2023-12-14 DIAGNOSIS — R06.02 SHORTNESS OF BREATH: ICD-10-CM

## 2023-12-14 LAB — LVEF ECHO: NORMAL

## 2023-12-14 PROCEDURE — 93306 TTE W/DOPPLER COMPLETE: CPT | Mod: 26 | Performed by: INTERNAL MEDICINE

## 2023-12-14 PROCEDURE — 93306 TTE W/DOPPLER COMPLETE: CPT

## 2023-12-14 NOTE — TELEPHONE ENCOUNTER
Made an appointment reminder call regarding NM Lexiscan stress test scheduled on 12/15 at 700. Reminded patient no caffeine after 6 pm and no food after midnight. Patient wants to have images on a disc as she has a cardiology appt at [place]  at 1500.

## 2023-12-15 ENCOUNTER — HOSPITAL ENCOUNTER (OUTPATIENT)
Dept: NUCLEAR MEDICINE | Facility: HOSPITAL | Age: 68
Setting detail: NUCLEAR MEDICINE
Discharge: HOME | End: 2023-12-15
Attending: NURSE PRACTITIONER
Payer: MEDICARE

## 2023-12-15 ENCOUNTER — HOSPITAL ENCOUNTER (OUTPATIENT)
Dept: CARDIOLOGY | Facility: HOSPITAL | Age: 68
Setting detail: NUCLEAR MEDICINE
Discharge: HOME | End: 2023-12-15
Attending: NURSE PRACTITIONER
Payer: MEDICARE

## 2023-12-15 DIAGNOSIS — R06.02 SHORTNESS OF BREATH: ICD-10-CM

## 2023-12-15 PROCEDURE — 93016 CV STRESS TEST SUPVJ ONLY: CPT | Performed by: INTERNAL MEDICINE

## 2023-12-15 PROCEDURE — 250N000011 HC RX IP 250 OP 636: Performed by: INTERNAL MEDICINE

## 2023-12-15 PROCEDURE — 343N000001 HC RX 343: Performed by: RADIOLOGY

## 2023-12-15 PROCEDURE — A9500 TC99M SESTAMIBI: HCPCS | Performed by: RADIOLOGY

## 2023-12-15 PROCEDURE — 93018 CV STRESS TEST I&R ONLY: CPT | Performed by: INTERNAL MEDICINE

## 2023-12-15 PROCEDURE — 93017 CV STRESS TEST TRACING ONLY: CPT

## 2023-12-15 PROCEDURE — G1010 CDSM STANSON: HCPCS

## 2023-12-15 RX ORDER — REGADENOSON 0.08 MG/ML
0.4 INJECTION, SOLUTION INTRAVENOUS ONCE
Status: COMPLETED | OUTPATIENT
Start: 2023-12-15 | End: 2023-12-15

## 2023-12-15 RX ADMIN — REGADENOSON 0.4 MG: 0.08 INJECTION, SOLUTION INTRAVENOUS at 09:29

## 2023-12-15 RX ADMIN — Medication 10 MILLICURIE: at 07:20

## 2023-12-15 RX ADMIN — Medication 32 MILLICURIE: at 09:30

## 2023-12-16 LAB
CV BLOOD PRESSURE: 63 MMHG
CV STRESS MAX HR HE: 100
NUC STRESS EJECTION FRACTION: 77 %
RATE PRESSURE PRODUCT: NORMAL
STRESS ECHO BASELINE DIASTOLIC HE: 100
STRESS ECHO BASELINE HR: 64 BPM
STRESS ECHO BASELINE SYSTOLIC BP: 172
STRESS ECHO CALCULATED PERCENT HR: 66 %
STRESS ECHO LAST STRESS DIASTOLIC BP: 104
STRESS ECHO LAST STRESS SYSTOLIC BP: 196
STRESS ECHO TARGET HR: 152

## 2023-12-18 ENCOUNTER — TELEPHONE (OUTPATIENT)
Dept: FAMILY MEDICINE | Facility: OTHER | Age: 68
End: 2023-12-18

## 2023-12-18 NOTE — TELEPHONE ENCOUNTER
Spoke with Rosa re: echo and stress test results. Had visit with cardiology at [facility] already. Rosa has no  questions at this time. I am attempting to notify her cardiology team at [facility] (through [facility]) of results. Reports follow up cardiology appointment mid January. Denies any new or worsening symptoms.   Edith Drummond, APRN, CNP

## 2024-01-31 DIAGNOSIS — F33.0 MILD RECURRENT MAJOR DEPRESSION (H): ICD-10-CM

## 2024-01-31 DIAGNOSIS — F41.1 GENERALIZED ANXIETY DISORDER: ICD-10-CM

## 2024-01-31 RX ORDER — DULOXETIN HYDROCHLORIDE 60 MG/1
60 CAPSULE, DELAYED RELEASE ORAL AT BEDTIME
Qty: 90 CAPSULE | Refills: 1 | Status: SHIPPED | OUTPATIENT
Start: 2024-01-31 | End: 2024-07-24

## 2024-01-31 NOTE — TELEPHONE ENCOUNTER
Cymbalta      Last Written Prescription Date:  8/24/23  Last Fill Quantity: 30,   # refills: 4  Last Office Visit: 12/12/23  Future Office visit:       Routing refill request to provider for review/approval because:

## 2024-02-19 DIAGNOSIS — G62.9 PERIPHERAL POLYNEUROPATHY: ICD-10-CM

## 2024-02-19 RX ORDER — GABAPENTIN 100 MG/1
CAPSULE ORAL
Qty: 90 CAPSULE | Refills: 1 | Status: SHIPPED | OUTPATIENT
Start: 2024-02-19

## 2024-02-19 NOTE — TELEPHONE ENCOUNTER
NEURONTIN      Last Written Prescription Date:  11-29-23  Last Fill Quantity: 90,   # refills: 1  Last Office Visit: 10-4-2023  Future Office visit:       Routing refill request to provider for review/approval because:  Drug not on the [facility], [facility] or [facility] refill protocol or controlled substance

## 2024-02-19 NOTE — TELEPHONE ENCOUNTER
Routing refill request to provider for review/approval because:    Drug not on the [facility], [facility], [facility] or [facility] refill protocol or controlled substance

## 2024-04-17 NOTE — PROGRESS NOTES
Assessment & Plan     1. Pre-diabetes  Stable, start zepbound   - Hemoglobin A1c; Future  - tirzepatide-Weight Management (ZEPBOUND) 2.5 MG/0.5ML prefilled pen; Inject 0.5 mLs (2.5 mg) Subcutaneous every 7 days  Dispense: 2 mL; Refill: 1    2. Hyperlipidemia LDL goal <100  Continue crestor,   - Lipid Profile; Future    3. Essential hypertension  Well controlled   - Comprehensive metabolic panel; Future  - TSH with free T4 reflex; Future    4. Mild recurrent major depression (H24)  Continue cymbalta     5. Generalized anxiety disorder  As above    6. Iron deficiency anemia secondary to inadequate dietary iron intake  - CBC with Platelets & Differential; Future    7. Primary insomnia  - zolpidem ER (AMBIEN CR) 12.5 MG CR tablet; Take 1 tablet (12.5 mg) by mouth at bedtime  Dispense: 30 tablet; Refill: 1    8. Class 3 severe obesity due to excess calories with serious comorbidity and body mass index (BMI) of 45.0 to 49.9 in adult (H)  - tirzepatide-Weight Management (ZEPBOUND) 2.5 MG/0.5ML prefilled pen; Inject 0.5 mLs (2.5 mg) Subcutaneous every 7 days  Dispense: 2 mL; Refill: 1      Follow-up in 1 month or as needed     The longitudinal plan of care for the diagnosis(es)/condition(s) as documented were addressed during this visit. Due to the added complexity in care, I will continue to support Rosa in the subsequent management and with ongoing continuity of care.    Amanda Meehan,   Certified Adult Nurse Practitioner  [phone]      Myrna Lee is a 68 year old, presenting for the following health issues:  Recheck Medication (Med review)        4/19/2024    11:09 AM   Additional Questions   Roomed by Queenie Pham   Accompanied by none     HPI     Pre-Diabetes Follow-up    How often are you checking your blood sugar? Not at all  What concerns do you have today about your diabetes? sweating   Do you have any of these symptoms? (Select all that apply)  Blurry vision    Has tried metformin in  the past.        Hyperlipidemia Follow-Up    Are you regularly taking any medication or supplement to lower your cholesterol?   Yes- crestor  Are you having muscle aches or other side effects that you think could be caused by your cholesterol lowering medication?  No    Hypertension Follow-up    Do you check your blood pressure regularly outside of the clinic? Yes , once in a while  Are you following a low salt diet? No  Are your blood pressures ever more than 140 on the top number (systolic) OR more   than 90 on the bottom number (diastolic), for example 140/90? Yes    BP Readings from Last 2 Encounters:   04/19/24 130/75   12/12/23 (!) 140/80     Hemoglobin A1C (%)   Date Value   09/20/2023 6.4 (H)   06/16/2023 6.3 (H)     LDL Cholesterol Calculated (mg/dL)   Date Value   09/20/2023 91   06/16/2023 65         Depression   How are you doing with your depression since your last visit? No change  Are you having other symptoms that might be associated with depression? No  Have you had a significant life event?  No   Are you feeling anxious or having panic attacks?   No  Do you have any concerns with your use of alcohol or other drugs? No    Social History     Tobacco Use    Smoking status: Never    Smokeless tobacco: Never   Vaping Use    Vaping status: Never Used   Substance Use Topics    Alcohol use: Not Currently    Drug use: Not Currently         6/21/2023    10:14 AM 12/12/2023    10:51 AM 4/19/2024    10:48 AM   PHQ   PHQ-9 Total Score 1 6 6   Q9: Thoughts of better off dead/self-harm past 2 weeks Not at all Not at all Not at all         3/9/2023    10:00 AM 5/18/2023    11:03 AM 12/12/2023    10:52 AM   YAZ-7 SCORE   Total Score  4 (minimal anxiety) 2 (minimal anxiety)   Total Score 0 4 2         4/19/2024    10:48 AM   Last PHQ-9   1.  Little interest or pleasure in doing things 0   2.  Feeling down, depressed, or hopeless 0   3.  Trouble falling or staying asleep, or sleeping too much 3   4.  Feeling tired or  having little energy 1   5.  Poor appetite or overeating 2   6.  Feeling bad about yourself 0   7.  Trouble concentrating 0   8.  Moving slowly or restless 0   Q9: Thoughts of better off dead/self-harm past 2 weeks 0   PHQ-9 Total Score 6         12/12/2023    10:52 AM   YAZ-7    1. Feeling nervous, anxious, or on edge 0   2. Not being able to stop or control worrying 0   3. Worrying too much about different things 0   4. Trouble relaxing 0   5. Being so restless that it is hard to sit still 1   6. Becoming easily annoyed or irritable 0   7. Feeling afraid, as if something awful might happen 1   YAZ-7 Total Score 2   If you checked any problems, how difficult have they made it for you to do your work, take care of things at home, or get along with other people? Somewhat difficult       Suicide Assessment Five-step Evaluation and Treatment (SAFE-T)        Recent Labs   Lab Test 09/20/23  0816 06/16/23  0824 03/09/23  1104 10/21/22  1108 07/27/19  1353   A1C 6.4* 6.3* 6.3*   < >  --    LDL 91 65 142*   < >  --    HDL 56 60 52   < >  --    TRIG 118 93 134   < >  --    ALT 15 11 11   < > 14   CR 0.92 0.80 0.84   < > 0.82   GFRESTIMATED 67 80 76   < > 76   GFRESTBLACK  --   --   --   --  88   POTASSIUM 4.2 4.4 4.6   < > 3.9   TSH 1.99 1.27 1.28   < >  --     < > = values in this interval not displayed.      BP Readings from Last 3 Encounters:   04/19/24 130/75   12/12/23 (!) 140/80   10/04/23 130/80    Wt Readings from Last 3 Encounters:   04/19/24 120.7 kg (266 lb)   12/12/23 119.2 kg (262 lb 11.2 oz)   10/04/23 123.2 kg (271 lb 11.2 oz)                        Review of Systems  Constitutional, neuro, ENT, endocrine, pulmonary, cardiac, gastrointestinal, genitourinary, musculoskeletal, integument and psychiatric systems are negative, except as otherwise noted.      Objective    /75 (BP Location: Left arm, Patient Position: Sitting, Cuff Size: Adult Regular)   Pulse 59   Temp (!) 96.7  F (35.9  C) (Tympanic)    "Ht 1.626 m (5' 4.02\")   Wt 120.7 kg (266 lb)   SpO2 98%   BMI 45.64 kg/m    Body mass index is 45.64 kg/m .  Physical Exam   GENERAL: alert and no distress  NECK: no adenopathy, no asymmetry, masses, or scars, thyroid normal to palpation, and no carotid bruits  RESP: lungs clear to auscultation - no rales, rhonchi or wheezes  CV: regular rate and rhythm, normal S1 S2, no S3 or S4, no murmur  MS: no gross musculoskeletal defects noted, no edema  PSYCH: mentation appears normal, affect normal/bright            Signed Electronically by: Amanda Meehan NP    "

## 2024-04-19 ENCOUNTER — TELEPHONE (OUTPATIENT)
Dept: FAMILY MEDICINE | Facility: OTHER | Age: 69
End: 2024-04-19

## 2024-04-19 ENCOUNTER — OFFICE VISIT (OUTPATIENT)
Dept: FAMILY MEDICINE | Facility: OTHER | Age: 69
End: 2024-04-19
Attending: NURSE PRACTITIONER
Payer: MEDICARE

## 2024-04-19 VITALS
BODY MASS INDEX: 45.41 KG/M2 | WEIGHT: 266 LBS | OXYGEN SATURATION: 98 % | DIASTOLIC BLOOD PRESSURE: 75 MMHG | HEART RATE: 59 BPM | TEMPERATURE: 96.7 F | SYSTOLIC BLOOD PRESSURE: 130 MMHG | HEIGHT: 64 IN

## 2024-04-19 DIAGNOSIS — R73.03 PRE-DIABETES: Primary | ICD-10-CM

## 2024-04-19 DIAGNOSIS — D50.8 IRON DEFICIENCY ANEMIA SECONDARY TO INADEQUATE DIETARY IRON INTAKE: ICD-10-CM

## 2024-04-19 DIAGNOSIS — F33.0 MILD RECURRENT MAJOR DEPRESSION (H): ICD-10-CM

## 2024-04-19 DIAGNOSIS — F41.1 GENERALIZED ANXIETY DISORDER: ICD-10-CM

## 2024-04-19 DIAGNOSIS — E66.813 CLASS 3 SEVERE OBESITY DUE TO EXCESS CALORIES WITH SERIOUS COMORBIDITY AND BODY MASS INDEX (BMI) OF 45.0 TO 49.9 IN ADULT (H): ICD-10-CM

## 2024-04-19 DIAGNOSIS — F51.01 PRIMARY INSOMNIA: ICD-10-CM

## 2024-04-19 DIAGNOSIS — E66.01 CLASS 3 SEVERE OBESITY DUE TO EXCESS CALORIES WITH SERIOUS COMORBIDITY AND BODY MASS INDEX (BMI) OF 45.0 TO 49.9 IN ADULT (H): ICD-10-CM

## 2024-04-19 DIAGNOSIS — E78.5 HYPERLIPIDEMIA LDL GOAL <100: ICD-10-CM

## 2024-04-19 DIAGNOSIS — I10 ESSENTIAL HYPERTENSION: ICD-10-CM

## 2024-04-19 LAB
ALBUMIN SERPL BCG-MCNC: 4.1 G/DL (ref 3.5–5.2)
ALP SERPL-CCNC: 80 U/L (ref 40–150)
ALT SERPL W P-5'-P-CCNC: 11 U/L (ref 0–50)
ANION GAP SERPL CALCULATED.3IONS-SCNC: 10 MMOL/L (ref 7–15)
AST SERPL W P-5'-P-CCNC: 16 U/L (ref 0–45)
BASOPHILS # BLD AUTO: 0.1 10E3/UL (ref 0–0.2)
BASOPHILS NFR BLD AUTO: 1 %
BILIRUB SERPL-MCNC: 1.2 MG/DL
BUN SERPL-MCNC: 9.4 MG/DL (ref 8–23)
CALCIUM SERPL-MCNC: 9.2 MG/DL (ref 8.8–10.2)
CHLORIDE SERPL-SCNC: 104 MMOL/L (ref 98–107)
CHOLEST SERPL-MCNC: 135 MG/DL
CREAT SERPL-MCNC: 0.79 MG/DL (ref 0.51–0.95)
DEPRECATED HCO3 PLAS-SCNC: 28 MMOL/L (ref 22–29)
EGFRCR SERPLBLD CKD-EPI 2021: 81 ML/MIN/1.73M2
EOSINOPHIL # BLD AUTO: 0.7 10E3/UL (ref 0–0.7)
EOSINOPHIL NFR BLD AUTO: 11 %
ERYTHROCYTE [DISTWIDTH] IN BLOOD BY AUTOMATED COUNT: 14.7 % (ref 10–15)
EST. AVERAGE GLUCOSE BLD GHB EST-MCNC: 134 MG/DL
FASTING STATUS PATIENT QL REPORTED: NO
GLUCOSE SERPL-MCNC: 102 MG/DL (ref 70–99)
HBA1C MFR BLD: 6.3 %
HCT VFR BLD AUTO: 39.8 %
HDLC SERPL-MCNC: 62 MG/DL
HGB BLD-MCNC: 12.2 G/DL
IMM GRANULOCYTES # BLD: 0 10E3/UL
IMM GRANULOCYTES NFR BLD: 0 %
LDLC SERPL CALC-MCNC: 52 MG/DL
LYMPHOCYTES # BLD AUTO: 1.6 10E3/UL (ref 0.8–5.3)
LYMPHOCYTES NFR BLD AUTO: 25 %
MCH RBC QN AUTO: 25.8 PG (ref 26.5–33)
MCHC RBC AUTO-ENTMCNC: 30.7 G/DL (ref 31.5–36.5)
MCV RBC AUTO: 84 FL (ref 78–100)
MONOCYTES # BLD AUTO: 0.7 10E3/UL (ref 0–1.3)
MONOCYTES NFR BLD AUTO: 10 %
NEUTROPHILS # BLD AUTO: 3.4 10E3/UL (ref 1.6–8.3)
NEUTROPHILS NFR BLD AUTO: 53 %
NONHDLC SERPL-MCNC: 73 MG/DL
PLATELET # BLD AUTO: 295 10E3/UL (ref 150–450)
POTASSIUM SERPL-SCNC: 4.4 MMOL/L (ref 3.4–5.3)
PROT SERPL-MCNC: 6.9 G/DL (ref 6.4–8.3)
RBC # BLD AUTO: 4.73 10E6/UL
SODIUM SERPL-SCNC: 142 MMOL/L (ref 135–145)
TRIGL SERPL-MCNC: 107 MG/DL
TSH SERPL DL<=0.005 MIU/L-ACNC: 1.24 UIU/ML (ref 0.3–4.2)
WBC # BLD AUTO: 6.4 10E3/UL (ref 4–11)

## 2024-04-19 PROCEDURE — 99214 OFFICE O/P EST MOD 30 MIN: CPT | Performed by: NURSE PRACTITIONER

## 2024-04-19 PROCEDURE — G0463 HOSPITAL OUTPT CLINIC VISIT: HCPCS

## 2024-04-19 PROCEDURE — G2211 COMPLEX E/M VISIT ADD ON: HCPCS | Performed by: NURSE PRACTITIONER

## 2024-04-19 PROCEDURE — 83036 HEMOGLOBIN GLYCOSYLATED A1C: CPT | Mod: ZL | Performed by: NURSE PRACTITIONER

## 2024-04-19 PROCEDURE — 36415 COLL VENOUS BLD VENIPUNCTURE: CPT | Mod: ZL | Performed by: NURSE PRACTITIONER

## 2024-04-19 PROCEDURE — 85004 AUTOMATED DIFF WBC COUNT: CPT | Mod: ZL | Performed by: NURSE PRACTITIONER

## 2024-04-19 PROCEDURE — 80053 COMPREHEN METABOLIC PANEL: CPT | Mod: ZL | Performed by: NURSE PRACTITIONER

## 2024-04-19 PROCEDURE — 84443 ASSAY THYROID STIM HORMONE: CPT | Mod: ZL | Performed by: NURSE PRACTITIONER

## 2024-04-19 PROCEDURE — 80061 LIPID PANEL: CPT | Mod: ZL | Performed by: NURSE PRACTITIONER

## 2024-04-19 RX ORDER — ZOLPIDEM TARTRATE 12.5 MG/1
12.5 TABLET, FILM COATED, EXTENDED RELEASE ORAL AT BEDTIME
Qty: 30 TABLET | Refills: 1 | Status: SHIPPED | OUTPATIENT
Start: 2024-04-19 | End: 2024-06-28

## 2024-04-19 ASSESSMENT — PAIN SCALES - GENERAL: PAINLEVEL: MILD PAIN (2)

## 2024-04-19 NOTE — TELEPHONE ENCOUNTER
Received PA request from Dina Alonso for tirzepatide-Weight Management (ZEPBOUND) 2.5 MG/0.5ML prefilled pen. Submitted on [facility], waiting for response.

## 2024-04-22 ENCOUNTER — TELEPHONE (OUTPATIENT)
Dept: FAMILY MEDICINE | Facility: OTHER | Age: 69
End: 2024-04-22

## 2024-04-22 DIAGNOSIS — R73.03 PRE-DIABETES: Primary | ICD-10-CM

## 2024-04-22 PROBLEM — R94.39 ABNORMAL NUCLEAR STRESS TEST: Status: ACTIVE | Noted: 2023-12-29

## 2024-04-22 NOTE — TELEPHONE ENCOUNTER
Results requested: LAB RESULTS  FROM 4-19-24    Best number to reach patient at: [phone]     Best time to call patient: ANY TIME    Send to care team in basket.

## 2024-04-22 NOTE — TELEPHONE ENCOUNTER
Patient report to this writer during telephone conversation she has been taking metformin 500 mg twice daily, no side effects were reported during conversation. Patient is requesting a refill.

## 2024-04-22 NOTE — TELEPHONE ENCOUNTER
Received DENIAL PA from [facility] for  tirzepatide-Weight Management (ZEPBOUND) 2.5 MG/0.5ML prefilled pen.  Scanned into [employer], routed to provider.    Reasoning: Medicare PT D does not cover any weight loss meds.

## 2024-04-22 NOTE — TELEPHONE ENCOUNTER
Patient requesting a refill for Metformin 200 mg twice daily with meals. Patient reports medication is not on her medication list and patient let Amanda know she would call clinic with dose she is taking.     Refill to [facility].     DX to be associated as problem list reflecting pre-diabetes.

## 2024-05-04 ENCOUNTER — HEALTH MAINTENANCE LETTER (OUTPATIENT)
Age: 69
End: 2024-05-04

## 2024-05-20 NOTE — PROGRESS NOTES
Assessment & Plan     1. Impaired fasting glucose  Continue weight loss efforts.   - tirzepatide-Weight Management (ZEPBOUND) 2.5 MG/0.5ML prefilled pen; Inject 0.5 mLs (2.5 mg) Subcutaneous every 7 days For 4 weeks then increase to 0.5mg. weekly  Dispense: 3 mL; Refill: 0    2. Class 3 severe obesity due to excess calories with serious comorbidity and body mass index (BMI) of 45.0 to 49.9 in adult (H)  - tirzepatide-Weight Management (ZEPBOUND) 2.5 MG/0.5ML prefilled pen; Inject 0.5 mLs (2.5 mg) Subcutaneous every 7 days For 4 weeks then increase to 0.5mg. weekly  Dispense: 3 mL; Refill: 0    3. Other non-recurrent acute nonsuppurative otitis media of both ears  - sulfamethoxazole-trimethoprim (BACTRIM DS) 800-160 MG tablet; Take 1 tablet by mouth 2 times daily for 10 days  Dispense: 20 tablet; Refill: 0    4. Acute bronchitis with symptoms greater than 10 days  - albuterol (PROAIR HFA/PROVENTIL HFA/VENTOLIN HFA) 108 (90 Base) MCG/ACT inhaler; Inhale 2 puffs into the lungs every 6 hours as needed for shortness of breath, wheezing or cough  Dispense: 18 g; Refill: 1  - sulfamethoxazole-trimethoprim (BACTRIM DS) 800-160 MG tablet; Take 1 tablet by mouth 2 times daily for 10 days  Dispense: 20 tablet; Refill: 0    5. Visit for screening mammogram  - MA Screen Bilateral w/Damián; Future      Follow-up in 1 month or as needed     The longitudinal plan of care for the diagnosis(es)/condition(s) as documented were addressed during this visit. Due to the added complexity in care, I will continue to support Rosa in the subsequent management and with ongoing continuity of care.    Amanda Meehan,   Certified Adult Nurse Practitioner  [phone]      Subjective   Rosa is a 68 year old, presenting for the following health issues:  No chief complaint on file.    She is here today with her daughter Davida.      HPI     Concern - Weight loss  Onset: a few months  Description: weight loss  Intensity: mild  Progression  of Symptoms:  improving  Accompanying Signs & Symptoms: lost almost 8 lbs  Previous history of similar problem: none  Precipitating factors:        Worsened by: none  Alleviating factors:        Improved by: metformin  Therapies tried and outcome: metformin-losing weight    Could not get wegovy or bydureon covered.  They have several questions about on-line weight loss programs that offer GLP-1 medications to assist with weight loss.  Answered all as best I could.  They will continue to research.      Acute Illness   Acute illness concerns: dizziness, cough  Onset: months  Fever: no  Chills/Sweats: no  Headache (location?): YES  Sinus Pressure:no  Conjunctivitis:  no  Ear Pain: YES- bilateral  Rhinorrhea: YES  Congestion: YES  Sore Throat: YES   Cough: YES  Wheeze: YES  Decreased Appetite: no   Nausea: no   Vomiting: no  Diarrhea:  no  Dysuria/Freq.: no  Fatigue/Achiness: no  Sick/Strep Exposure: unsure  Reports dizziness.      Therapies Tried and outcome: none.       Recent Labs   Lab Test 04/19/24  1149 09/20/23  0816 06/16/23  0824 10/21/22  1108 07/27/19  1353   A1C 6.3* 6.4* 6.3*   < >  --    LDL 52 91 65   < >  --    HDL 62 56 60   < >  --    TRIG 107 118 93   < >  --    ALT 11 15 11   < > 14   CR 0.79 0.92 0.80   < > 0.82   GFRESTIMATED 81 67 80   < > 76   GFRESTBLACK  --   --   --   --  88   POTASSIUM 4.4 4.2 4.4   < > 3.9   TSH 1.24 1.99 1.27   < >  --     < > = values in this interval not displayed.      BP Readings from Last 3 Encounters:   05/21/24 120/78   04/19/24 130/75   12/12/23 (!) 140/80    Wt Readings from Last 3 Encounters:   05/21/24 118.3 kg (260 lb 12.8 oz)   04/19/24 120.7 kg (266 lb)   12/12/23 119.2 kg (262 lb 11.2 oz)                  Review of Systems  Constitutional, neuro, ENT, endocrine, pulmonary, cardiac, gastrointestinal, genitourinary, musculoskeletal, integument and psychiatric systems are negative, except as otherwise noted.      Objective    /78 (BP Location: Left arm,  "Patient Position: Sitting, Cuff Size: Adult Regular)   Pulse 64   Temp 97.4  F (36.3  C) (Tympanic)   Resp 18   Ht 1.626 m (5' 4.02\")   Wt 118.3 kg (260 lb 12.8 oz)   SpO2 98%   BMI 44.74 kg/m    Body mass index is 44.74 kg/m .  Physical Exam   GENERAL: alert and no distress  HENT: normal cephalic/atraumatic, both ears: erythematous and bulging membrane, nasal mucosa edematous , and rhinorrhea clear  NECK: no adenopathy, no asymmetry, masses, or scars  RESP: wheezing through out with mild cough.   CV: regular rate and rhythm, normal S1 S2, no S3 or S4, no murmur, click or rub, no peripheral edema  MS: no gross musculoskeletal defects noted, no edema  PSYCH: mentation appears normal, affect normal/bright            Signed Electronically by: Amanda Meehan NP    "

## 2024-05-21 ENCOUNTER — TELEPHONE (OUTPATIENT)
Dept: FAMILY MEDICINE | Facility: OTHER | Age: 69
End: 2024-05-21

## 2024-05-21 ENCOUNTER — OFFICE VISIT (OUTPATIENT)
Dept: FAMILY MEDICINE | Facility: OTHER | Age: 69
End: 2024-05-21
Attending: NURSE PRACTITIONER
Payer: MEDICARE

## 2024-05-21 VITALS
OXYGEN SATURATION: 98 % | WEIGHT: 260.8 LBS | RESPIRATION RATE: 18 BRPM | BODY MASS INDEX: 44.53 KG/M2 | DIASTOLIC BLOOD PRESSURE: 78 MMHG | HEIGHT: 64 IN | HEART RATE: 64 BPM | SYSTOLIC BLOOD PRESSURE: 120 MMHG | TEMPERATURE: 97.4 F

## 2024-05-21 DIAGNOSIS — H65.193 OTHER NON-RECURRENT ACUTE NONSUPPURATIVE OTITIS MEDIA OF BOTH EARS: ICD-10-CM

## 2024-05-21 DIAGNOSIS — E66.01 CLASS 3 SEVERE OBESITY DUE TO EXCESS CALORIES WITH SERIOUS COMORBIDITY AND BODY MASS INDEX (BMI) OF 45.0 TO 49.9 IN ADULT (H): ICD-10-CM

## 2024-05-21 DIAGNOSIS — R73.01 IMPAIRED FASTING GLUCOSE: Primary | ICD-10-CM

## 2024-05-21 DIAGNOSIS — E66.813 CLASS 3 SEVERE OBESITY DUE TO EXCESS CALORIES WITH SERIOUS COMORBIDITY AND BODY MASS INDEX (BMI) OF 45.0 TO 49.9 IN ADULT (H): ICD-10-CM

## 2024-05-21 DIAGNOSIS — Z12.31 VISIT FOR SCREENING MAMMOGRAM: ICD-10-CM

## 2024-05-21 DIAGNOSIS — J20.9 ACUTE BRONCHITIS WITH SYMPTOMS GREATER THAN 10 DAYS: ICD-10-CM

## 2024-05-21 PROCEDURE — G0463 HOSPITAL OUTPT CLINIC VISIT: HCPCS

## 2024-05-21 PROCEDURE — G2211 COMPLEX E/M VISIT ADD ON: HCPCS | Performed by: NURSE PRACTITIONER

## 2024-05-21 PROCEDURE — 99214 OFFICE O/P EST MOD 30 MIN: CPT | Performed by: NURSE PRACTITIONER

## 2024-05-21 RX ORDER — ALBUTEROL SULFATE 90 UG/1
2 AEROSOL, METERED RESPIRATORY (INHALATION) EVERY 6 HOURS PRN
Qty: 18 G | Refills: 1 | Status: SHIPPED | OUTPATIENT
Start: 2024-05-21

## 2024-05-21 RX ORDER — SULFAMETHOXAZOLE/TRIMETHOPRIM 800-160 MG
1 TABLET ORAL 2 TIMES DAILY
Qty: 20 TABLET | Refills: 0 | Status: SHIPPED | OUTPATIENT
Start: 2024-05-21 | End: 2024-05-31

## 2024-05-21 RX ORDER — RESPIRATORY SYNCYTIAL VIRUS VACCINE 120MCG/0.5
0.5 KIT INTRAMUSCULAR ONCE
Qty: 1 EACH | Refills: 0 | Status: CANCELLED | OUTPATIENT
Start: 2024-05-21 | End: 2024-05-21

## 2024-05-21 ASSESSMENT — PAIN SCALES - GENERAL: PAINLEVEL: NO PAIN (0)

## 2024-05-21 NOTE — PATIENT INSTRUCTIONS
Assessment & Plan     1. Impaired fasting glucose  Continue weight loss efforts.   - tirzepatide-Weight Management (ZEPBOUND) 2.5 MG/0.5ML prefilled pen; Inject 0.5 mLs (2.5 mg) Subcutaneous every 7 days For 4 weeks then increase to 0.5mg. weekly  Dispense: 3 mL; Refill: 0    2. Class 3 severe obesity due to excess calories with serious comorbidity and body mass index (BMI) of 45.0 to 49.9 in adult (H)  - tirzepatide-Weight Management (ZEPBOUND) 2.5 MG/0.5ML prefilled pen; Inject 0.5 mLs (2.5 mg) Subcutaneous every 7 days For 4 weeks then increase to 0.5mg. weekly  Dispense: 3 mL; Refill: 0    3. Other non-recurrent acute nonsuppurative otitis media of both ears  - sulfamethoxazole-trimethoprim (BACTRIM DS) 800-160 MG tablet; Take 1 tablet by mouth 2 times daily for 10 days  Dispense: 20 tablet; Refill: 0    4. Acute bronchitis with symptoms greater than 10 days  - albuterol (PROAIR HFA/PROVENTIL HFA/VENTOLIN HFA) 108 (90 Base) MCG/ACT inhaler; Inhale 2 puffs into the lungs every 6 hours as needed for shortness of breath, wheezing or cough  Dispense: 18 g; Refill: 1  - sulfamethoxazole-trimethoprim (BACTRIM DS) 800-160 MG tablet; Take 1 tablet by mouth 2 times daily for 10 days  Dispense: 20 tablet; Refill: 0    5. Visit for screening mammogram  - MA Screen Bilateral w/Damián; Future      Follow-up in 1 months or as needed    Amanda Meehan,   Certified Adult Nurse Practitioner  [phone]

## 2024-05-21 NOTE — TELEPHONE ENCOUNTER
Received DENIAL PA from [facility] for  tirzepatide-Weight Management (ZEPBOUND) 2.5 MG/0.5ML prefilled pen.  Scanned into [employer], routed to provider.    Reasoning: Medicare PT D does not cover weight loss meds.        Phone number listed above if PROVIDER wishes to call

## 2024-05-23 ENCOUNTER — TELEPHONE (OUTPATIENT)
Dept: MAMMOGRAPHY | Facility: OTHER | Age: 69
End: 2024-05-23

## 2024-05-23 ENCOUNTER — ANCILLARY PROCEDURE (OUTPATIENT)
Dept: MAMMOGRAPHY | Facility: OTHER | Age: 69
End: 2024-05-23
Attending: NURSE PRACTITIONER
Payer: MEDICARE

## 2024-05-23 DIAGNOSIS — Z12.31 VISIT FOR SCREENING MAMMOGRAM: ICD-10-CM

## 2024-05-23 PROCEDURE — 77063 BREAST TOMOSYNTHESIS BI: CPT | Mod: TC

## 2024-06-21 NOTE — PROGRESS NOTES
"  Assessment & Plan     1. Pre-diabetes  Continue metformin     2. Class 3 severe obesity due to excess calories with serious comorbidity and body mass index (BMI) of 45.0 to 49.9 in adult (H)  Continue weight loss efforts.     3. Iron deficiency anemia secondary to inadequate dietary iron intake  HGB improved    4. Vitamin D deficiency    5. Other cough  - General PFT Lab (Please always keep checked); Future  - Pulmonary Function Test; Future  - General PFT Lab (Please always keep checked)    6. Wheezing  - General PFT Lab (Please always keep checked); Future  - Pulmonary Function Test; Future  - General PFT Lab (Please always keep checked)    7. Gastroesophageal reflux disease without esophagitis  Continue omeprazole - new script sent in.     8. Peripheral polyneuropathy  Stop gabapentin.              BMI  Estimated body mass index is 43.43 kg/m  as calculated from the following:    Height as of this encounter: 1.626 m (5' 4.02\").    Weight as of this encounter: 114.9 kg (253 lb 3.2 oz).   Weight management plan: Discussed healthy diet and exercise guidelines      Return in about 3 months (around 9/24/2024) for pre-diabetes, lipids, HTN.    The longitudinal plan of care for the diagnosis(es)/condition(s) as documented were addressed during this visit. Due to the added complexity in care, I will continue to support Rosa in the subsequent management and with ongoing continuity of care.    Amanda Meehan,   Certified Adult Nurse Practitioner  [phone]      Subjective   Rosa is a 69 year old, presenting for the following health issues:  weight management    HPI     Concern - weight management   Onset: ongoing   Description: hard time losing weight   Intensity: severe  Progression of Symptoms:  last office visit weight was 260# today's 253#  Accompanying Signs & Symptoms: none  Previous history of similar problem: yes  Precipitating factors:        Worsened by: unknown   Alleviating factors:        " "Improved by: Metformin   Therapies tried and outcome: Metformin could not get Zepbound was 300 a month     crys other concern is ongoing cough with wheezing.  Is taking omeprazole for heartburn, feels those symptoms are improved.  She is using albuterol inhaler at bedtime which does help some.  She is not a smoker but has been around second hand smoke for years.     Recent Labs   Lab Test 04/19/24  1149 09/20/23  0816 06/16/23  0824 10/21/22  1108 07/27/19  1353   A1C 6.3* 6.4* 6.3*   < >  --    LDL 52 91 65   < >  --    HDL 62 56 60   < >  --    TRIG 107 118 93   < >  --    ALT 11 15 11   < > 14   CR 0.79 0.92 0.80   < > 0.82   GFRESTIMATED 81 67 80   < > 76   GFRESTBLACK  --   --   --   --  88   POTASSIUM 4.4 4.2 4.4   < > 3.9   TSH 1.24 1.99 1.27   < >  --     < > = values in this interval not displayed.      BP Readings from Last 3 Encounters:   06/24/24 110/78   05/21/24 120/78   04/19/24 130/75    Wt Readings from Last 3 Encounters:   06/24/24 114.9 kg (253 lb 3.2 oz)   05/21/24 118.3 kg (260 lb 12.8 oz)   04/19/24 120.7 kg (266 lb)                Review of Systems  Constitutional, neuro, ENT, endocrine, pulmonary, cardiac, gastrointestinal, genitourinary, musculoskeletal, integument and psychiatric systems are negative, except as otherwise noted.      Objective    /78 (BP Location: Right arm, Patient Position: Sitting, Cuff Size: Adult Large)   Pulse 59   Temp 98.6  F (37  C) (Tympanic)   Resp 16   Ht 1.626 m (5' 4.02\")   Wt 114.9 kg (253 lb 3.2 oz)   SpO2 97%   BMI 43.43 kg/m    Body mass index is 43.43 kg/m .  Physical Exam   GENERAL: alert and no distress  RESP: lungs clear to auscultation - no rales, rhonchi or wheezes  CV: regular rate and rhythm, normal S1 S2, no S3 or S4, no murmur, click or rub, no peripheral edema  MS: no gross musculoskeletal defects noted, no edema  PSYCH: mentation appears normal, affect normal/bright    Office Visit on 04/19/2024   Component Date Value Ref Range " Status    Estimated Average Glucose 04/19/2024 134  mg/dL Final    Hemoglobin A1C 04/19/2024 6.3 (H)  <5.7 % Final    Normal <5.7%   Prediabetes 5.7-6.4%    Diabetes 6.5% or higher     Note: Adopted from ADA consensus guidelines.    Cholesterol 04/19/2024 135  <200 mg/dL Final    Triglycerides 04/19/2024 107  <150 mg/dL Final    Direct Measure HDL 04/19/2024 62  >=50 mg/dL Final    LDL Cholesterol Calculated 04/19/2024 52  <=100 mg/dL Final    Non HDL Cholesterol 04/19/2024 73  <130 mg/dL Final    Patient Fasting > 8hrs? 04/19/2024 No   Final    Sodium 04/19/2024 142  135 - 145 mmol/L Final    Reference intervals for this test were updated on 09/26/2023 to more accurately reflect our healthy population. There may be differences in the flagging of prior results with similar values performed with this method. Interpretation of those prior results can be made in the context of the updated reference intervals.     Potassium 04/19/2024 4.4  3.4 - 5.3 mmol/L Final    Carbon Dioxide (CO2) 04/19/2024 28  22 - 29 mmol/L Final    Anion Gap 04/19/2024 10  7 - 15 mmol/L Final    Urea Nitrogen 04/19/2024 9.4  8.0 - 23.0 mg/dL Final    Creatinine 04/19/2024 0.79  0.51 - 0.95 mg/dL Final    GFR Estimate 04/19/2024 81  >60 mL/min/1.73m2 Final    Calcium 04/19/2024 9.2  8.8 - 10.2 mg/dL Final    Chloride 04/19/2024 104  98 - 107 mmol/L Final    Glucose 04/19/2024 102 (H)  70 - 99 mg/dL Final    Alkaline Phosphatase 04/19/2024 80  40 - 150 U/L Final    Reference intervals for this test were updated on 11/14/2023 to more accurately reflect our healthy population. There may be differences in the flagging of prior results with similar values performed with this method. Interpretation of those prior results can be made in the context of the updated reference intervals.    AST 04/19/2024 16  0 - 45 U/L Final    Reference intervals for this test were updated on 6/12/2023 to more accurately reflect our healthy population. There may be  differences in the flagging of prior results with similar values performed with this method. Interpretation of those prior results can be made in the context of the updated reference intervals.    ALT 04/19/2024 11  0 - 50 U/L Final    Reference intervals for this test were updated on 6/12/2023 to more accurately reflect our healthy population. There may be differences in the flagging of prior results with similar values performed with this method. Interpretation of those prior results can be made in the context of the updated reference intervals.      Protein Total 04/19/2024 6.9  6.4 - 8.3 g/dL Final    Albumin 04/19/2024 4.1  3.5 - 5.2 g/dL Final    Bilirubin Total 04/19/2024 1.2  <=1.2 mg/dL Final    TSH 04/19/2024 1.24  0.30 - 4.20 uIU/mL Final    WBC Count 04/19/2024 6.4  4.0 - 11.0 10e3/uL Final    RBC Count 04/19/2024 4.73  10e6/uL Final    Hemoglobin 04/19/2024 12.2  g/dL Final    Hematocrit 04/19/2024 39.8  % Final    MCV 04/19/2024 84  78 - 100 fL Final    MCH 04/19/2024 25.8 (L)  26.5 - 33.0 pg Final    MCHC 04/19/2024 30.7 (L)  31.5 - 36.5 g/dL Final    RDW 04/19/2024 14.7  10.0 - 15.0 % Final    Platelet Count 04/19/2024 295  150 - 450 10e3/uL Final    % Neutrophils 04/19/2024 53  % Final    % Lymphocytes 04/19/2024 25  % Final    % Monocytes 04/19/2024 10  % Final    % Eosinophils 04/19/2024 11  % Final    % Basophils 04/19/2024 1  % Final    % Immature Granulocytes 04/19/2024 0  % Final    Absolute Neutrophils 04/19/2024 3.4  1.6 - 8.3 10e3/uL Final    Absolute Lymphocytes 04/19/2024 1.6  0.8 - 5.3 10e3/uL Final    Absolute Monocytes 04/19/2024 0.7  0.0 - 1.3 10e3/uL Final    Absolute Eosinophils 04/19/2024 0.7  0.0 - 0.7 10e3/uL Final    Absolute Basophils 04/19/2024 0.1  0.0 - 0.2 10e3/uL Final    Absolute Immature Granulocytes 04/19/2024 0.0  <=0.4 10e3/uL Final           Signed Electronically by: Amanda Meehan, NP

## 2024-06-24 ENCOUNTER — OFFICE VISIT (OUTPATIENT)
Dept: FAMILY MEDICINE | Facility: OTHER | Age: 69
End: 2024-06-24
Attending: NURSE PRACTITIONER
Payer: MEDICARE

## 2024-06-24 VITALS
HEIGHT: 64 IN | DIASTOLIC BLOOD PRESSURE: 78 MMHG | WEIGHT: 253.2 LBS | SYSTOLIC BLOOD PRESSURE: 110 MMHG | HEART RATE: 59 BPM | RESPIRATION RATE: 16 BRPM | OXYGEN SATURATION: 97 % | BODY MASS INDEX: 43.23 KG/M2 | TEMPERATURE: 98.6 F

## 2024-06-24 DIAGNOSIS — R06.2 WHEEZING: ICD-10-CM

## 2024-06-24 DIAGNOSIS — R05.8 OTHER COUGH: ICD-10-CM

## 2024-06-24 DIAGNOSIS — R73.03 PRE-DIABETES: Primary | ICD-10-CM

## 2024-06-24 DIAGNOSIS — D50.8 IRON DEFICIENCY ANEMIA SECONDARY TO INADEQUATE DIETARY IRON INTAKE: ICD-10-CM

## 2024-06-24 DIAGNOSIS — E66.01 CLASS 3 SEVERE OBESITY DUE TO EXCESS CALORIES WITH SERIOUS COMORBIDITY AND BODY MASS INDEX (BMI) OF 45.0 TO 49.9 IN ADULT (H): ICD-10-CM

## 2024-06-24 DIAGNOSIS — E66.813 CLASS 3 SEVERE OBESITY DUE TO EXCESS CALORIES WITH SERIOUS COMORBIDITY AND BODY MASS INDEX (BMI) OF 45.0 TO 49.9 IN ADULT (H): ICD-10-CM

## 2024-06-24 DIAGNOSIS — G62.9 PERIPHERAL POLYNEUROPATHY: ICD-10-CM

## 2024-06-24 DIAGNOSIS — K21.9 GASTROESOPHAGEAL REFLUX DISEASE WITHOUT ESOPHAGITIS: ICD-10-CM

## 2024-06-24 DIAGNOSIS — E55.9 VITAMIN D DEFICIENCY: ICD-10-CM

## 2024-06-24 PROCEDURE — G2211 COMPLEX E/M VISIT ADD ON: HCPCS | Performed by: NURSE PRACTITIONER

## 2024-06-24 PROCEDURE — 99214 OFFICE O/P EST MOD 30 MIN: CPT | Performed by: NURSE PRACTITIONER

## 2024-06-24 PROCEDURE — G0463 HOSPITAL OUTPT CLINIC VISIT: HCPCS

## 2024-06-24 RX ORDER — RESPIRATORY SYNCYTIAL VIRUS VACCINE 120MCG/0.5
0.5 KIT INTRAMUSCULAR ONCE
Qty: 1 EACH | Refills: 0 | Status: CANCELLED | OUTPATIENT
Start: 2024-06-24 | End: 2024-06-24

## 2024-06-24 RX ORDER — OMEPRAZOLE 40 MG/1
40 CAPSULE, DELAYED RELEASE ORAL DAILY
Qty: 180 CAPSULE | Refills: 3 | Status: SHIPPED | OUTPATIENT
Start: 2024-06-24

## 2024-06-24 ASSESSMENT — ANXIETY QUESTIONNAIRES
4. TROUBLE RELAXING: NOT AT ALL
5. BEING SO RESTLESS THAT IT IS HARD TO SIT STILL: NOT AT ALL
7. FEELING AFRAID AS IF SOMETHING AWFUL MIGHT HAPPEN: NOT AT ALL
3. WORRYING TOO MUCH ABOUT DIFFERENT THINGS: NOT AT ALL
IF YOU CHECKED OFF ANY PROBLEMS ON THIS QUESTIONNAIRE, HOW DIFFICULT HAVE THESE PROBLEMS MADE IT FOR YOU TO DO YOUR WORK, TAKE CARE OF THINGS AT HOME, OR GET ALONG WITH OTHER PEOPLE: SOMEWHAT DIFFICULT
2. NOT BEING ABLE TO STOP OR CONTROL WORRYING: NOT AT ALL
GAD7 TOTAL SCORE: 1
8. IF YOU CHECKED OFF ANY PROBLEMS, HOW DIFFICULT HAVE THESE MADE IT FOR YOU TO DO YOUR WORK, TAKE CARE OF THINGS AT HOME, OR GET ALONG WITH OTHER PEOPLE?: SOMEWHAT DIFFICULT
6. BECOMING EASILY ANNOYED OR IRRITABLE: SEVERAL DAYS
1. FEELING NERVOUS, ANXIOUS, OR ON EDGE: NOT AT ALL
GAD7 TOTAL SCORE: 1
GAD7 TOTAL SCORE: 1
7. FEELING AFRAID AS IF SOMETHING AWFUL MIGHT HAPPEN: NOT AT ALL

## 2024-06-24 ASSESSMENT — PATIENT HEALTH QUESTIONNAIRE - PHQ9
SUM OF ALL RESPONSES TO PHQ QUESTIONS 1-9: 0
10. IF YOU CHECKED OFF ANY PROBLEMS, HOW DIFFICULT HAVE THESE PROBLEMS MADE IT FOR YOU TO DO YOUR WORK, TAKE CARE OF THINGS AT HOME, OR GET ALONG WITH OTHER PEOPLE: NOT DIFFICULT AT ALL
SUM OF ALL RESPONSES TO PHQ QUESTIONS 1-9: 0

## 2024-06-24 ASSESSMENT — PAIN SCALES - GENERAL: PAINLEVEL: NO PAIN (0)

## 2024-06-24 NOTE — PATIENT INSTRUCTIONS
"  Assessment & Plan     1. Pre-diabetes  Continue metformin     2. Class 3 severe obesity due to excess calories with serious comorbidity and body mass index (BMI) of 45.0 to 49.9 in adult (H)  Continue weight loss efforts.     3. Iron deficiency anemia secondary to inadequate dietary iron intake  HGB improved    4. Vitamin D deficiency    5. Other cough  - General PFT Lab (Please always keep checked); Future  - Pulmonary Function Test; Future  - General PFT Lab (Please always keep checked)    6. Wheezing  - General PFT Lab (Please always keep checked); Future  - Pulmonary Function Test; Future  - General PFT Lab (Please always keep checked)    7. Gastroesophageal reflux disease without esophagitis  Continue omeprazole    8. Peripheral polyneuropathy  Stop gabapentin.            BMI  Estimated body mass index is 43.43 kg/m  as calculated from the following:    Height as of this encounter: 1.626 m (5' 4.02\").    Weight as of this encounter: 114.9 kg (253 lb 3.2 oz).   Weight management plan: Discussed healthy diet and exercise guidelines      Return in about 3 months (around 9/24/2024) for pre-diabetes, lipids, HTN.  "

## 2024-06-27 DIAGNOSIS — F51.01 PRIMARY INSOMNIA: ICD-10-CM

## 2024-06-28 RX ORDER — ZOLPIDEM TARTRATE 12.5 MG/1
12.5 TABLET, FILM COATED, EXTENDED RELEASE ORAL AT BEDTIME
Qty: 30 TABLET | Refills: 1 | Status: SHIPPED | OUTPATIENT
Start: 2024-06-28 | End: 2024-08-29

## 2024-06-28 NOTE — TELEPHONE ENCOUNTER
ZOLPIDEM 12.5MG ER TABLET         Last Written Prescription Date:  4/19/24  Last Fill Quantity: 30,   # refills: 1  Last Office Visit: 6/24/24  Future Office visit:    Next 5 appointments (look out 90 days)      Sep 25, 2024 10:30 AM  (Arrive by 10:15 AM)  Provider Visit with Amanda Meehan NP  [facility] ([facility] ) [address]  [facility] [address]  [phone]             Routing refill request to provider for review/approval because:  Drug not on the [facility], [facility] or  Health refill protocol or controlled substance

## 2024-07-24 DIAGNOSIS — F41.1 GENERALIZED ANXIETY DISORDER: ICD-10-CM

## 2024-07-24 DIAGNOSIS — F33.0 MILD RECURRENT MAJOR DEPRESSION (H): ICD-10-CM

## 2024-07-24 RX ORDER — DULOXETIN HYDROCHLORIDE 60 MG/1
60 CAPSULE, DELAYED RELEASE ORAL AT BEDTIME
Qty: 90 CAPSULE | Refills: 1 | Status: SHIPPED | OUTPATIENT
Start: 2024-07-24

## 2024-07-29 DIAGNOSIS — F51.01 PRIMARY INSOMNIA: ICD-10-CM

## 2024-07-30 RX ORDER — ZOLPIDEM TARTRATE 12.5 MG/1
12.5 TABLET, FILM COATED, EXTENDED RELEASE ORAL AT BEDTIME
Qty: 30 TABLET | Refills: 1 | OUTPATIENT
Start: 2024-07-30

## 2024-07-30 NOTE — TELEPHONE ENCOUNTER
Ambien      Last Written Prescription Date:  6/28/24  Last Fill Quantity: 30,   # refills: 1  Last Office Visit: 6/24/24  Future Office visit:    Next 5 appointments (look out 90 days)      Sep 25, 2024 10:30 AM  (Arrive by 10:15 AM)  Provider Visit with Amanda Meehan NP  [facility] ([facility] ) [address]  [facility] [address]  [phone]             Routing refill request to provider for review/approval because:

## 2024-08-29 DIAGNOSIS — F51.01 PRIMARY INSOMNIA: ICD-10-CM

## 2024-08-29 RX ORDER — ZOLPIDEM TARTRATE 12.5 MG/1
12.5 TABLET, FILM COATED, EXTENDED RELEASE ORAL AT BEDTIME
Qty: 30 TABLET | Refills: 0 | Status: SHIPPED | OUTPATIENT
Start: 2024-08-29 | End: 2024-09-25

## 2024-08-29 NOTE — TELEPHONE ENCOUNTER
Ambien      Last Written Prescription Date:  7.30.24  Last Fill Quantity: #30,   # refills: 0  Last Office Visit: 6.24.24  Future Office visit:    Next 5 appointments (look out 90 days)      Sep 25, 2024 10:30 AM  (Arrive by 10:15 AM)  Provider Visit with Amanda Meehan NP  [facility] ([facility] ) [address]  [place] [facility] [place] [address]  [phone]             Routing refill request to provider for review/approval because:  Drug not on the [facility], [facility] or  Health refill protocol or controlled substance

## 2024-09-25 DIAGNOSIS — F51.01 PRIMARY INSOMNIA: ICD-10-CM

## 2024-09-25 RX ORDER — ZOLPIDEM TARTRATE 12.5 MG/1
12.5 TABLET, FILM COATED, EXTENDED RELEASE ORAL AT BEDTIME
Qty: 30 TABLET | Refills: 0 | Status: SHIPPED | OUTPATIENT
Start: 2024-09-25

## 2024-09-25 NOTE — TELEPHONE ENCOUNTER
ZOLPIDEM 12.5MG ER TABLET         Last Written Prescription Date:  8/29/24  Last Fill Quantity: 30,   # refills: 0  Last Office Visit: 6/24/24  Future Office visit:    Next 5 appointments (look out 90 days)      Oct 16, 2024 10:00 AM  (Arrive by 9:45 AM)  Provider Visit with Amanda Meehan NP  [facility] ([facility] ) [address]  [place] [place] [address]  [phone]             Routing refill request to provider for review/approval because:  Drug not on the [facility], [facility] or  Health refill protocol or controlled substance

## 2024-09-30 ENCOUNTER — TELEPHONE (OUTPATIENT)
Dept: FAMILY MEDICINE | Facility: OTHER | Age: 69
End: 2024-09-30

## 2024-09-30 NOTE — TELEPHONE ENCOUNTER
Received PA request from Dina Alonso for zolpidem ER (AMBIEN CR) 12.5 MG CR tablet. Submitted on CMM, waiting for response.

## 2024-09-30 NOTE — TELEPHONE ENCOUNTER
Received PA APPROVAL from [employer] for zolpidem ER (AMBIEN CR) 12.5 MG CR tablet. Scanned into EPIC, Dates 09/30/24 - 12/31/2024

## 2024-10-03 ENCOUNTER — OFFICE VISIT (OUTPATIENT)
Dept: FAMILY MEDICINE | Facility: OTHER | Age: 69
End: 2024-10-03
Attending: NURSE PRACTITIONER
Payer: MEDICARE

## 2024-10-03 ENCOUNTER — ANCILLARY PROCEDURE (OUTPATIENT)
Dept: GENERAL RADIOLOGY | Facility: OTHER | Age: 69
End: 2024-10-03
Attending: NURSE PRACTITIONER
Payer: MEDICARE

## 2024-10-03 VITALS
SYSTOLIC BLOOD PRESSURE: 136 MMHG | TEMPERATURE: 97.3 F | HEART RATE: 64 BPM | OXYGEN SATURATION: 96 % | BODY MASS INDEX: 23.67 KG/M2 | RESPIRATION RATE: 16 BRPM | DIASTOLIC BLOOD PRESSURE: 85 MMHG | WEIGHT: 138 LBS

## 2024-10-03 DIAGNOSIS — R10.32 ABDOMINAL PAIN, LEFT LOWER QUADRANT: ICD-10-CM

## 2024-10-03 DIAGNOSIS — R73.03 PRE-DIABETES: Primary | ICD-10-CM

## 2024-10-03 DIAGNOSIS — F51.01 PRIMARY INSOMNIA: ICD-10-CM

## 2024-10-03 DIAGNOSIS — K21.9 GASTROESOPHAGEAL REFLUX DISEASE WITHOUT ESOPHAGITIS: ICD-10-CM

## 2024-10-03 DIAGNOSIS — N30.00 ACUTE CYSTITIS WITHOUT HEMATURIA: ICD-10-CM

## 2024-10-03 DIAGNOSIS — E78.5 HYPERLIPIDEMIA LDL GOAL <100: ICD-10-CM

## 2024-10-03 DIAGNOSIS — Z79.899 ON STATIN THERAPY: ICD-10-CM

## 2024-10-03 DIAGNOSIS — F41.1 GENERALIZED ANXIETY DISORDER: ICD-10-CM

## 2024-10-03 DIAGNOSIS — F33.0 MILD RECURRENT MAJOR DEPRESSION (H): ICD-10-CM

## 2024-10-03 DIAGNOSIS — I10 ESSENTIAL HYPERTENSION: ICD-10-CM

## 2024-10-03 LAB
ALBUMIN SERPL BCG-MCNC: 4.1 G/DL (ref 3.5–5.2)
ALBUMIN UR-MCNC: ABNORMAL MG/DL
ALP SERPL-CCNC: 68 U/L (ref 40–150)
ALT SERPL W P-5'-P-CCNC: 27 U/L (ref 0–50)
ANION GAP SERPL CALCULATED.3IONS-SCNC: 12 MMOL/L (ref 7–15)
APPEARANCE UR: CLEAR
AST SERPL W P-5'-P-CCNC: 31 U/L (ref 0–45)
BACTERIA #/AREA URNS HPF: ABNORMAL /HPF
BASOPHILS # BLD AUTO: 0 10E3/UL (ref 0–0.2)
BASOPHILS NFR BLD AUTO: 0 %
BILIRUB SERPL-MCNC: 1.6 MG/DL
BILIRUB UR QL STRIP: NEGATIVE
BUN SERPL-MCNC: 8.5 MG/DL (ref 8–23)
CALCIUM SERPL-MCNC: 9.3 MG/DL (ref 8.8–10.4)
CHLORIDE SERPL-SCNC: 103 MMOL/L (ref 98–107)
CHOLEST SERPL-MCNC: 121 MG/DL
COLOR UR AUTO: YELLOW
CREAT SERPL-MCNC: 1.15 MG/DL (ref 0.51–0.95)
EGFRCR SERPLBLD CKD-EPI 2021: 51 ML/MIN/1.73M2
EOSINOPHIL # BLD AUTO: 0.2 10E3/UL (ref 0–0.7)
EOSINOPHIL NFR BLD AUTO: 3 %
ERYTHROCYTE [DISTWIDTH] IN BLOOD BY AUTOMATED COUNT: 15.2 % (ref 10–15)
EST. AVERAGE GLUCOSE BLD GHB EST-MCNC: 123 MG/DL
FASTING STATUS PATIENT QL REPORTED: NO
FASTING STATUS PATIENT QL REPORTED: NO
GLUCOSE SERPL-MCNC: 95 MG/DL (ref 70–99)
GLUCOSE UR STRIP-MCNC: NEGATIVE MG/DL
HBA1C MFR BLD: 5.9 %
HCO3 SERPL-SCNC: 23 MMOL/L (ref 22–29)
HCT VFR BLD AUTO: 39.4 % (ref 35–47)
HDLC SERPL-MCNC: 56 MG/DL
HGB BLD-MCNC: 12.4 G/DL (ref 11.7–15.7)
HGB UR QL STRIP: ABNORMAL
IMM GRANULOCYTES # BLD: 0 10E3/UL
IMM GRANULOCYTES NFR BLD: 0 %
KETONES UR STRIP-MCNC: NEGATIVE MG/DL
LDLC SERPL CALC-MCNC: 45 MG/DL
LEUKOCYTE ESTERASE UR QL STRIP: ABNORMAL
LYMPHOCYTES # BLD AUTO: 1.9 10E3/UL (ref 0.8–5.3)
LYMPHOCYTES NFR BLD AUTO: 25 %
MCH RBC QN AUTO: 27.1 PG (ref 26.5–33)
MCHC RBC AUTO-ENTMCNC: 31.5 G/DL (ref 31.5–36.5)
MCV RBC AUTO: 86 FL (ref 78–100)
MONOCYTES # BLD AUTO: 0.6 10E3/UL (ref 0–1.3)
MONOCYTES NFR BLD AUTO: 8 %
NEUTROPHILS # BLD AUTO: 4.7 10E3/UL (ref 1.6–8.3)
NEUTROPHILS NFR BLD AUTO: 63 %
NITRATE UR QL: NEGATIVE
NONHDLC SERPL-MCNC: 65 MG/DL
PH UR STRIP: 6 [PH] (ref 5–7)
PLATELET # BLD AUTO: 250 10E3/UL (ref 150–450)
POTASSIUM SERPL-SCNC: 4.1 MMOL/L (ref 3.4–5.3)
PROT SERPL-MCNC: 6.6 G/DL (ref 6.4–8.3)
RBC # BLD AUTO: 4.57 10E6/UL (ref 3.8–5.2)
RBC #/AREA URNS AUTO: ABNORMAL /HPF
SODIUM SERPL-SCNC: 138 MMOL/L (ref 135–145)
SP GR UR STRIP: <=1.005 (ref 1–1.03)
SQUAMOUS #/AREA URNS AUTO: ABNORMAL /LPF
TRIGL SERPL-MCNC: 102 MG/DL
TSH SERPL DL<=0.005 MIU/L-ACNC: 1.25 UIU/ML (ref 0.3–4.2)
UROBILINOGEN UR STRIP-ACNC: 0.2 E.U./DL
WBC # BLD AUTO: 7.5 10E3/UL (ref 4–11)
WBC #/AREA URNS AUTO: ABNORMAL /HPF

## 2024-10-03 PROCEDURE — 87086 URINE CULTURE/COLONY COUNT: CPT | Mod: ZL | Performed by: NURSE PRACTITIONER

## 2024-10-03 PROCEDURE — G2211 COMPLEX E/M VISIT ADD ON: HCPCS | Performed by: NURSE PRACTITIONER

## 2024-10-03 PROCEDURE — 83036 HEMOGLOBIN GLYCOSYLATED A1C: CPT | Mod: ZL | Performed by: NURSE PRACTITIONER

## 2024-10-03 PROCEDURE — G0463 HOSPITAL OUTPT CLINIC VISIT: HCPCS

## 2024-10-03 PROCEDURE — 36415 COLL VENOUS BLD VENIPUNCTURE: CPT | Mod: ZL | Performed by: NURSE PRACTITIONER

## 2024-10-03 PROCEDURE — 80061 LIPID PANEL: CPT | Mod: ZL | Performed by: NURSE PRACTITIONER

## 2024-10-03 PROCEDURE — 81001 URINALYSIS AUTO W/SCOPE: CPT | Mod: ZL | Performed by: NURSE PRACTITIONER

## 2024-10-03 PROCEDURE — 80053 COMPREHEN METABOLIC PANEL: CPT | Mod: ZL | Performed by: NURSE PRACTITIONER

## 2024-10-03 PROCEDURE — 74019 RADEX ABDOMEN 2 VIEWS: CPT | Mod: TC,FY

## 2024-10-03 PROCEDURE — 84443 ASSAY THYROID STIM HORMONE: CPT | Mod: ZL | Performed by: NURSE PRACTITIONER

## 2024-10-03 PROCEDURE — 85025 COMPLETE CBC W/AUTO DIFF WBC: CPT | Mod: ZL | Performed by: NURSE PRACTITIONER

## 2024-10-03 PROCEDURE — 99214 OFFICE O/P EST MOD 30 MIN: CPT | Performed by: NURSE PRACTITIONER

## 2024-10-03 PROCEDURE — 81003 URINALYSIS AUTO W/O SCOPE: CPT | Mod: ZL | Performed by: NURSE PRACTITIONER

## 2024-10-03 RX ORDER — CIPROFLOXACIN 500 MG/1
500 TABLET, FILM COATED ORAL 2 TIMES DAILY
Qty: 14 TABLET | Refills: 0 | Status: SHIPPED | OUTPATIENT
Start: 2024-10-03 | End: 2024-10-10

## 2024-10-03 ASSESSMENT — PAIN SCALES - GENERAL: PAINLEVEL: SEVERE PAIN (6)

## 2024-10-03 NOTE — PROGRESS NOTES
Assessment & Plan     1. Pre-diabetes  Continue metformin  - Hemoglobin A1c; Future    2. Hyperlipidemia LDL goal <100  Continue crestor  - Lipid Profile; Future    3. Essential hypertension  Controlled   - Comprehensive metabolic panel; Future  - TSH with free T4 reflex; Future    4. Mild recurrent major depression (H)  Stable, continue cymbalta.     5. Generalized anxiety disorder  As avoe    6. Gastroesophageal reflux disease without esophagitis  Continue omeprazole    7. Primary insomnia  Continue ambien    8. On statin therapy  - Comprehensive metabolic panel; Future    9. Abdominal pain, left lower quadrant  - CBC with Platelets & Differential; Future - normal  - UA Macroscopic with reflex to Microscopic and Culture; Future - UTI noted  - XR ABDOMEN 2 VW (Clinic Performed); Future    10. Acute cystitis without hematuria  Increase fluids  - ciprofloxacin (CIPRO) 500 MG tablet; Take 1 tablet (500 mg) by mouth 2 times daily for 7 days.  Dispense: 14 tablet; Refill: 0    Follow-up in 6 months or if no improvement or any worsening.     The longitudinal plan of care for the diagnosis(es)/condition(s) as documented were addressed during this visit. Due to the added complexity in care, I will continue to support Rosa in the subsequent management and with ongoing continuity of care.    Amanda Meehan,   Certified Adult Nurse Practitioner  [phone]      Subjective   Rosa is a 69 year old, presenting for the following health issues:  Diabetes (Pre), Hypertension, and Lipids        10/3/2024     3:39 PM   Additional Questions   Roomed by Cristina JOLLEY   Accompanied by None     HPI     Diabetes Follow-up (PRE)    How often are you checking your blood sugar? Not at all  What concerns do you have today about your diabetes? None   Do you have any of these symptoms? (Select all that apply)  Numbness in feet      Hyperlipidemia Follow-Up    Are you regularly taking any medication or supplement to lower your  cholesterol?   Yes- Crestor 40 mg  Are you having muscle aches or other side effects that you think could be caused by your cholesterol lowering medication?  No    Hypertension Follow-up    Do you check your blood pressure regularly outside of the clinic? Yes   Are you following a low salt diet? Yes  Are your blood pressures ever more than 140 on the top number (systolic) OR more   than 90 on the bottom number (diastolic), for example 140/90? No    BP Readings from Last 3 Encounters:   10/03/24 136/85   06/24/24 110/78   05/21/24 120/78    Wt Readings from Last 3 Encounters:   10/03/24 62.6 kg (138 lb)   06/24/24 114.9 kg (253 lb 3.2 oz)   05/21/24 118.3 kg (260 lb 12.8 oz)                    Hemoglobin A1C (%)   Date Value   04/19/2024 6.3 (H)   09/20/2023 6.4 (H)     LDL Cholesterol Calculated (mg/dL)   Date Value   04/19/2024 52   09/20/2023 91       Pain History:  When did you first notice your pain? 1 1/2 months   Have you seen anyone else for your pain? No  How has your pain affected your ability to work? Not applicable  Where in your body do you have pain? Abdominal/Flank Pain  Onset/Duration: 1 1/2 months  Description:   Character: Sharp and Dull ache  Location: left lower quadrant   Radiation: left flank  Intensity: moderate  Progression of Symptoms:  worsening  Accompanying Signs & Symptoms:  Fever/Chills: YES- sweats   Gas/Bloating: No  Nausea: No  Vomitting: No  Diarrhea: YES  Constipation: No  Dysuria or Hematuria: No  History:   Trauma: No  Previous similar pain: No  Previous tests done: none  Precipitating factors:   Does the pain change with:     Food: No    Bowel Movement: No    Urination: Frequency    Other factors:  No  Therapies tried and outcome: Tylenol, Ibuprofen     No LMP recorded. Patient has had a hysterectomy.          Review of Systems  Constitutional, neuro, ENT, endocrine, pulmonary, cardiac, gastrointestinal, genitourinary, musculoskeletal, integument and psychiatric systems are  negative, except as otherwise noted.      Objective    /85 (BP Location: Left arm, Patient Position: Sitting, Cuff Size: Adult Large)   Pulse 64   Temp 97.3  F (36.3  C) (Tympanic)   Resp 16   Wt 62.6 kg (138 lb)   SpO2 96%   Breastfeeding No   BMI 23.67 kg/m    Body mass index is 23.67 kg/m .  Physical Exam   GENERAL: alert and no distress  NECK: no adenopathy, no asymmetry, masses, or scars  RESP: lungs clear to auscultation - no rales, rhonchi or wheezes  CV: regular rate and rhythm, normal S1 S2, no S3 or S4, no murmur, click or rub, no peripheral edema  ABDOMEN: tenderness generalized but worse left lower quadrant.  bowel sounds normal, tenderness I wth percussion over left kidney.    MS: no gross musculoskeletal defects noted, no edema  PSYCH: mentation appears normal, affect normal/bright    Results for orders placed or performed in visit on 10/03/24   XR ABDOMEN 2 VW (Clinic Performed)     Status: None    Narrative    Exam: XR ABDOMEN 2 VIEWS.    Exam Reason: Abdominal pain, left lower quadrant    Comparison: 8/22/2023    FINDINGS:     There are couple of mildly prominent loops of small bowel. No free  air.     No acute osseous abnormality. Visualized lung bases are clear.      Impression    IMPRESSION:    Nonspecific bowel gas pattern with a couple of mildly prominent loops  of small bowel. No free air.    CIARA PEARCE MD         SYSTEM ID:  [id number]   Results for orders placed or performed in visit on 10/03/24   Hemoglobin A1c     Status: Abnormal   Result Value Ref Range    Estimated Average Glucose 123 (H) <117 mg/dL    Hemoglobin A1C 5.9 (H) <5.7 %   Lipid Profile     Status: None   Result Value Ref Range    Cholesterol 121 <200 mg/dL    Triglycerides 102 <150 mg/dL    Direct Measure HDL 56 >=50 mg/dL    LDL Cholesterol Calculated 45 <100 mg/dL    Non HDL Cholesterol 65 <130 mg/dL    Patient Fasting > 8hrs? No     Narrative    Cholesterol  Desirable: < 200 mg/dL  Borderline High: 200 -  239 mg/dL  High: >= 240 mg/dL    Triglycerides  Normal: < 150 mg/dL  Borderline High: 150 - 199 mg/dL  High: 200-499 mg/dL  Very High: >= 500 mg/dL    Direct Measure HDL  Female: >= 50 mg/dL   Male: >= 40 mg/dL    LDL Cholesterol  Desirable: < 100 mg/dL  Above Desirable: 100 - 129 mg/dL   Borderline High: 130 - 159 mg/dL   High:  160 - 189 mg/dL   Very High: >= 190 mg/dL    Non HDL Cholesterol  Desirable: < 130 mg/dL  Above Desirable: 130 - 159 mg/dL  Borderline High: 160 - 189 mg/dL  High: 190 - 219 mg/dL  Very High: >= 220 mg/dL   Comprehensive metabolic panel     Status: Abnormal   Result Value Ref Range    Sodium 138 135 - 145 mmol/L    Potassium 4.1 3.4 - 5.3 mmol/L    Carbon Dioxide (CO2) 23 22 - 29 mmol/L    Anion Gap 12 7 - 15 mmol/L    Urea Nitrogen 8.5 8.0 - 23.0 mg/dL    Creatinine 1.15 (H) 0.51 - 0.95 mg/dL    GFR Estimate 51 (L) >60 mL/min/1.73m2    Calcium 9.3 8.8 - 10.4 mg/dL    Chloride 103 98 - 107 mmol/L    Glucose 95 70 - 99 mg/dL    Alkaline Phosphatase 68 40 - 150 U/L    AST 31 0 - 45 U/L    ALT 27 0 - 50 U/L    Protein Total 6.6 6.4 - 8.3 g/dL    Albumin 4.1 3.5 - 5.2 g/dL    Bilirubin Total 1.6 (H) <=1.2 mg/dL    Patient Fasting > 8hrs? No    TSH with free T4 reflex     Status: Normal   Result Value Ref Range    TSH 1.25 0.30 - 4.20 uIU/mL   CBC with platelets and differential     Status: Abnormal   Result Value Ref Range    WBC Count 7.5 4.0 - 11.0 10e3/uL    RBC Count 4.57 3.80 - 5.20 10e6/uL    Hemoglobin 12.4 11.7 - 15.7 g/dL    Hematocrit 39.4 35.0 - 47.0 %    MCV 86 78 - 100 fL    MCH 27.1 26.5 - 33.0 pg    MCHC 31.5 31.5 - 36.5 g/dL    RDW 15.2 (H) 10.0 - 15.0 %    Platelet Count 250 150 - 450 10e3/uL    % Neutrophils 63 %    % Lymphocytes 25 %    % Monocytes 8 %    % Eosinophils 3 %    % Basophils 0 %    % Immature Granulocytes 0 %    Absolute Neutrophils 4.7 1.6 - 8.3 10e3/uL    Absolute Lymphocytes 1.9 0.8 - 5.3 10e3/uL    Absolute Monocytes 0.6 0.0 - 1.3 10e3/uL    Absolute  Eosinophils 0.2 0.0 - 0.7 10e3/uL    Absolute Basophils 0.0 0.0 - 0.2 10e3/uL    Absolute Immature Granulocytes 0.0 <=0.4 10e3/uL   UA Macroscopic with reflex to Microscopic and Culture     Status: Abnormal    Specimen: Urine, Midstream   Result Value Ref Range    Color Urine Yellow Colorless, Straw, Light Yellow, Yellow    Appearance Urine Clear Clear    Glucose Urine Negative Negative mg/dL    Bilirubin Urine Negative Negative    Ketones Urine Negative Negative mg/dL    Specific Gravity Urine <=1.005 1.003 - 1.035    Blood Urine Small (A) Negative    pH Urine 6.0 5.0 - 7.0    Protein Albumin Urine Trace (A) Negative mg/dL    Urobilinogen Urine 0.2 0.2, 1.0 E.U./dL    Nitrite Urine Negative Negative    Leukocyte Esterase Urine Moderate (A) Negative   Urine Microscopic Exam     Status: Abnormal   Result Value Ref Range    Bacteria Urine Few (A) None Seen /HPF    RBC Urine 2-5 (A) 0-2 /HPF /HPF    WBC Urine 10-25 (A) 0-5 /HPF /HPF    Squamous Epithelials Urine Few (A) None Seen /LPF   Urine Culture     Status: None (Preliminary result)    Specimen: Urine, Midstream   Result Value Ref Range    Culture Culture in progress    CBC with Platelets & Differential     Status: Abnormal    Narrative    The following orders were created for panel order CBC with Platelets & Differential.  Procedure                               Abnormality         Status                     ---------                               -----------         ------                     CBC with platelets and d...[[id number]]  Abnormal            Final result                 Please view results for these tests on the individual orders.             Signed Electronically by: Amanda Meehan NP

## 2024-10-05 LAB — BACTERIA UR CULT: NORMAL

## 2024-10-12 DIAGNOSIS — R73.03 PRE-DIABETES: ICD-10-CM

## 2024-10-14 NOTE — TELEPHONE ENCOUNTER
Metformin      Last Written Prescription Date:  4/22/24  Last Fill Quantity: 180,   # refills: 1  Last Office Visit: 10/3/24  Future Office visit:       Routing refill request to provider for review/approval because:

## 2024-10-30 DIAGNOSIS — F51.01 PRIMARY INSOMNIA: ICD-10-CM

## 2024-10-31 ENCOUNTER — TELEPHONE (OUTPATIENT)
Dept: FAMILY MEDICINE | Facility: OTHER | Age: 69
End: 2024-10-31

## 2024-10-31 RX ORDER — ZOLPIDEM TARTRATE 12.5 MG/1
12.5 TABLET, FILM COATED, EXTENDED RELEASE ORAL AT BEDTIME
Qty: 30 TABLET | Refills: 0 | Status: SHIPPED | OUTPATIENT
Start: 2024-10-31

## 2024-10-31 NOTE — TELEPHONE ENCOUNTER
Reason for call:  Medication      Have you contacted your pharmacy? Yes  PHARMACY IS WAITING HEAR BACK FROM PROVIDER - PATIENT IS COMPLETELY OUT OF MEDICATION   Medication ZOLPIDEM ER 12.5 MG CR TABLET  What Pharmacy do you use? [facility] IN [place]      (Please note that the turn-around-time for prescriptions is 72 business hours; I am sending your request at this time. SEND TO appropriate Care Team Pool )

## 2024-10-31 NOTE — TELEPHONE ENCOUNTER
ZOLPIDEM 12.5MG ER TABLET         Last Written Prescription Date:  9/25/24  Last Fill Quantity: 30,   # refills: 0  Last Office Visit: 10/3/24  Future Office visit:       Routing refill request to provider for review/approval because:  Drug not on the [facility], [facility] or [facility] refill protocol or controlled substance

## 2024-11-25 DIAGNOSIS — F51.01 PRIMARY INSOMNIA: ICD-10-CM

## 2024-11-26 NOTE — TELEPHONE ENCOUNTER
ZOLPIDEM 12.5MG ER TABLET         Last Written Prescription Date:  10/31/24  Last Fill Quantity: 30,   # refills: 0  Last Office Visit: 10/3/24  Future Office visit:       Routing refill request to provider for review/approval because:  Drug not on the [facility], [facility] or [facility] refill protocol or controlled substance

## 2024-11-27 RX ORDER — ZOLPIDEM TARTRATE 12.5 MG/1
12.5 TABLET, FILM COATED, EXTENDED RELEASE ORAL AT BEDTIME
Qty: 30 TABLET | Refills: 0 | Status: SHIPPED | OUTPATIENT
Start: 2024-11-27

## 2024-12-10 ENCOUNTER — NURSE TRIAGE (OUTPATIENT)
Dept: FAMILY MEDICINE | Facility: OTHER | Age: 69
End: 2024-12-10

## 2024-12-10 NOTE — TELEPHONE ENCOUNTER
"Symptom or reason needing to speak to RN: NOT FEELING RIGHT - TIRED - HEAD \"WONKY\" - SHARP PAINS THROUGH HEAD  - BLURRY VISION     Best number to return call: [phone]     Best time to return call: ANY   "

## 2024-12-10 NOTE — TELEPHONE ENCOUNTER
"Nurse Triage SBAR    Is this a 2nd Level Triage? NO    Situation: New Onset of Headaches x 3 days    Background: Headaches starting x 3 days ago, stabbing/shooting pain left posterior head intermittently, severe when stabbing/shooting pain comes on. Mild pain during time of telephone call. Vision blurry x 3 days, LT arm feeling \"dead/heavy\" starting today, 12/10/24.    Assessment: severe headaches intermittently with blurry vision and left arm feeling heavy.     Protocol Recommended Disposition:   Go To ED/UCC Now (Or To Office With PCP Approval)    Recommendation: patient advised to go to the ED now to be evaluated per protocol.      Routed to provider    Does the patient meet one of the following criteria for ADS visit consideration? No     Reason for Disposition   Loss of vision or double vision (Exception: Same as previously diagnosed migraines.)    Additional Information   Negative: Difficult to awaken or acting confused (e.g., disoriented, slurred speech)   Negative: Weakness of the face, arm or leg on one side of the body and new-onset   Negative: Numbness of the face, arm or leg on one side of the body and new-onset   Negative: Loss of speech or garbled speech and new-onset   Negative: Passed out (e.g., fainted, lost consciousness, blacked out and was not responding)   Negative: Sounds like a life-threatening emergency to the triager   Negative: Followed a head injury within last 3 days   Negative: Traumatic Brain Injury (TBI) is suspected   Negative: Sinus pain or congestion is main symptom(s)   Negative: Influenza suspected (i.e., cough, fever, other respiratory symptoms; probable influenza exposure)   Negative: Pregnant   Negative: Unable to walk without falling   Negative: Stiff neck (can't touch chin to chest)   Negative: Other family members (or people in same household) with headaches and possibility of carbon monoxide exposure   Negative: SEVERE headache, sudden-onset (i.e., reaching maximum " "intensity within seconds to 1 hour)   Negative: Severe pain in one eye   Negative: SEVERE headache, states 'worst headache' of life    Answer Assessment - Initial Assessment Questions  1. LOCATION: \"Where does it hurt?\"       Left posterior portion of head    2. ONSET: \"When did the headache start?\" (e.g., minutes, hours, days)       X 3 days     3. PATTERN: \"Does the pain come and go, or has it been constant since it started?\"      Comes and goes     4. SEVERITY: \"How bad is the pain?\" and \"What does it keep you from doing?\"  (e.g., Scale 1-10; mild, moderate, or severe)      Stabbing/shooting pain. Pain comes and goes.       Mild pain during time of call, increases in severity when feels the stabbing/shooting pain.     5. RECURRENT SYMPTOM: \"Have you ever had headaches before?\" If Yes, ask: \"When was the last time?\" and \"What happened that time?\"       No     6. CAUSE: \"What do you think is causing the headache?\"      Unknown     7. MIGRAINE: \"Have you been diagnosed with migraine headaches?\" If Yes, ask: \"Is this headache similar?\"       No     8. HEAD INJURY: \"Has there been any recent injury to the head?\"       Patient reports a fall 1 1/2 weeks ago, unable to recall if hit head, patient does not believe she hit her head. Patient was bowling and lost balance.     9. OTHER SYMPTOMS: \"Do you have any other symptoms?\" (e.g., fever, stiff neck, eye pain, sore throat, cold symptoms)      Vision is blurry, eyes feel heavy.     10. PREGNANCY: \"Is there any chance you are pregnant?\" \"When was your last menstrual period?\"        No    Protocols used: Headache-A-OH    "

## 2024-12-11 ENCOUNTER — TELEPHONE (OUTPATIENT)
Dept: FAMILY MEDICINE | Facility: OTHER | Age: 69
End: 2024-12-11

## 2024-12-11 NOTE — TELEPHONE ENCOUNTER
10:45 AM    Reason for Call: OVERBOOK    Patient is having the following symptoms: Radha calling from [facility] for patient to have hospital follow up in 3 days. Please call patient directly to get her schedule. days.    The patient is requesting an appointment for Overbook with Tapan Sauer.    Was an appointment offered for this call? No  If yes : Appointment type              Date    Preferred method for responding to this message: Telephone Call  What is your phone number ?  [phone]    If we cannot reach you directly, may we leave a detailed response at the number you provided? Yes    Can this message wait until your PCP/provider returns, if unavailable today? YES, Provider is in    Diana Tomlinson

## 2024-12-13 NOTE — PROGRESS NOTES
"  Assessment & Plan     1. TIA (transient ischemic attack) (Primary)  ED notes, labs and scans reviewed.   Continue 81mg aspirin.     2. Pre-diabetes  Continue metformin.    - metFORMIN (GLUCOPHAGE) 500 MG tablet; Take 2 tablets (1,000 mg) by mouth 2 times daily (with meals).  Dispense: 360 tablet; Refill: 1    3. Gastroesophageal reflux disease without esophagitis  Stable   - omeprazole (PRILOSEC) 40 MG DR capsule; Take 1 capsule (40 mg) by mouth daily.  Dispense: 90 capsule; Refill: 1    4. Essential hypertension  Stable   - metoprolol tartrate (LOPRESSOR) 50 MG tablet; Take 0.5 tablets (25 mg) by mouth 2 times daily.  Dispense: 180 tablet; Refill: 1    5. Primary insomnia  Continue current plan.   - zolpidem ER (AMBIEN CR) 12.5 MG CR tablet; Take 1 tablet (12.5 mg) by mouth at bedtime.  Dispense: 90 tablet; Refill: 0          MED REC REQUIRED  Post Medication Reconciliation Status: discharge medications reconciled, continue medications without change  BMI  Estimated body mass index is 39.57 kg/m  as calculated from the following:    Height as of this encounter: 1.626 m (5' 4.02\").    Weight as of this encounter: 104.6 kg (230 lb 11.2 oz).   Weight management plan: Discussed healthy diet and exercise guidelines    Follow-up in 1 month or as needed    The longitudinal plan of care for the diagnosis(es)/condition(s) as documented were addressed during this visit. Due to the added complexity in care, I will continue to support Rosa in the subsequent management and with ongoing continuity of care.    Amanda Meehan,   Certified Adult Nurse Practitioner  [phone]        Subjective   Rosa is a 69 year old, presenting for the following health issues:  Hospital F/U    [facility]       Hospital Follow-up Visit:    Hospital/Nursing Home/ Rehab Facility:  [facility]   Date of Admission: 12/10/24  Date of Discharge: 12/11/24  Reason(s) for Admission: TIA   Was the patient in the ICU or did the patient experience " "delirium during hospitalization?  No  Do you have any other stressors you would like to discuss with your provider? No    Problems taking medications regularly:  None  Medication changes since discharge: None  Problems adhering to non-medication therapy:  None    Summary of hospitalization:  CareEverywhere information obtained and reviewed  Diagnostic Tests/Treatments reviewed.  Follow up needed: she has a follow-up with cardiology and neurology.    Other Healthcare Providers Involved in Patient s Care:         Specialist appointment - neurology and cardiology   Update since discharge: stable.     Plan of care communicated with patient and family       BP Readings from Last 3 Encounters:   12/16/24 120/66   10/03/24 136/85   06/24/24 110/78    Wt Readings from Last 3 Encounters:   12/16/24 104.6 kg (230 lb 11.2 oz)   10/03/24 62.6 kg (138 lb)   06/24/24 114.9 kg (253 lb 3.2 oz)                  Review of Systems  Constitutional, neuro, ENT, endocrine, pulmonary, cardiac, gastrointestinal, genitourinary, musculoskeletal, integument and psychiatric systems are negative, except as otherwise noted.      Objective    /66 (BP Location: Left arm, Patient Position: Chair, Cuff Size: Adult Large)   Pulse 68   Temp 98.3  F (36.8  C) (Tympanic)   Resp 16   Ht 1.626 m (5' 4.02\")   Wt 104.6 kg (230 lb 11.2 oz)   SpO2 98%   BMI 39.57 kg/m    Body mass index is 39.57 kg/m .  Physical Exam   GENERAL: alert and no distress  NECK: no adenopathy, no asymmetry, masses, or scars, thyroid normal to palpation, and no carotid bruits  RESP: lungs clear to auscultation - no rales, rhonchi or wheezes  CV: regular rate and rhythm, normal S1 S2, no S3 or S4, no murmur  MS: no gross musculoskeletal defects noted, no edema  NEURO: Normal strength and tone, mentation intact and speech normal  PSYCH: mentation appears normal, affect normal/bright            Signed Electronically by: Amanda Meehan NP    "

## 2024-12-16 ENCOUNTER — OFFICE VISIT (OUTPATIENT)
Dept: FAMILY MEDICINE | Facility: OTHER | Age: 69
End: 2024-12-16
Attending: NURSE PRACTITIONER
Payer: MEDICARE

## 2024-12-16 VITALS
DIASTOLIC BLOOD PRESSURE: 66 MMHG | RESPIRATION RATE: 16 BRPM | TEMPERATURE: 98.3 F | HEIGHT: 64 IN | WEIGHT: 230.7 LBS | BODY MASS INDEX: 39.38 KG/M2 | HEART RATE: 68 BPM | SYSTOLIC BLOOD PRESSURE: 120 MMHG | OXYGEN SATURATION: 98 %

## 2024-12-16 DIAGNOSIS — F51.01 PRIMARY INSOMNIA: ICD-10-CM

## 2024-12-16 DIAGNOSIS — I10 ESSENTIAL HYPERTENSION: ICD-10-CM

## 2024-12-16 DIAGNOSIS — K21.9 GASTROESOPHAGEAL REFLUX DISEASE WITHOUT ESOPHAGITIS: ICD-10-CM

## 2024-12-16 DIAGNOSIS — R73.03 PRE-DIABETES: ICD-10-CM

## 2024-12-16 DIAGNOSIS — G45.9 TIA (TRANSIENT ISCHEMIC ATTACK): Primary | ICD-10-CM

## 2024-12-16 PROCEDURE — 99214 OFFICE O/P EST MOD 30 MIN: CPT | Performed by: NURSE PRACTITIONER

## 2024-12-16 PROCEDURE — G2211 COMPLEX E/M VISIT ADD ON: HCPCS | Performed by: NURSE PRACTITIONER

## 2024-12-16 PROCEDURE — G0463 HOSPITAL OUTPT CLINIC VISIT: HCPCS

## 2024-12-16 RX ORDER — OMEPRAZOLE 40 MG/1
40 CAPSULE, DELAYED RELEASE ORAL DAILY
Qty: 90 CAPSULE | Refills: 1 | Status: SHIPPED | OUTPATIENT
Start: 2024-12-16

## 2024-12-16 RX ORDER — METOPROLOL TARTRATE 50 MG
25 TABLET ORAL 2 TIMES DAILY
Qty: 180 TABLET | Refills: 1 | Status: SHIPPED | OUTPATIENT
Start: 2024-12-16

## 2024-12-16 RX ORDER — ZOLPIDEM TARTRATE 12.5 MG/1
12.5 TABLET, FILM COATED, EXTENDED RELEASE ORAL AT BEDTIME
Qty: 90 TABLET | Refills: 0 | Status: SHIPPED | OUTPATIENT
Start: 2024-12-16

## 2024-12-16 ASSESSMENT — ANXIETY QUESTIONNAIRES
IF YOU CHECKED OFF ANY PROBLEMS ON THIS QUESTIONNAIRE, HOW DIFFICULT HAVE THESE PROBLEMS MADE IT FOR YOU TO DO YOUR WORK, TAKE CARE OF THINGS AT HOME, OR GET ALONG WITH OTHER PEOPLE: NOT DIFFICULT AT ALL
7. FEELING AFRAID AS IF SOMETHING AWFUL MIGHT HAPPEN: NOT AT ALL
1. FEELING NERVOUS, ANXIOUS, OR ON EDGE: NOT AT ALL
4. TROUBLE RELAXING: NOT AT ALL
8. IF YOU CHECKED OFF ANY PROBLEMS, HOW DIFFICULT HAVE THESE MADE IT FOR YOU TO DO YOUR WORK, TAKE CARE OF THINGS AT HOME, OR GET ALONG WITH OTHER PEOPLE?: NOT DIFFICULT AT ALL
GAD7 TOTAL SCORE: 1
5. BEING SO RESTLESS THAT IT IS HARD TO SIT STILL: NOT AT ALL
2. NOT BEING ABLE TO STOP OR CONTROL WORRYING: NOT AT ALL
6. BECOMING EASILY ANNOYED OR IRRITABLE: NOT AT ALL
3. WORRYING TOO MUCH ABOUT DIFFERENT THINGS: SEVERAL DAYS
GAD7 TOTAL SCORE: 1
GAD7 TOTAL SCORE: 1
7. FEELING AFRAID AS IF SOMETHING AWFUL MIGHT HAPPEN: NOT AT ALL

## 2024-12-16 ASSESSMENT — PAIN SCALES - GENERAL: PAINLEVEL_OUTOF10: NO PAIN (0)

## 2024-12-16 ASSESSMENT — PATIENT HEALTH QUESTIONNAIRE - PHQ9
SUM OF ALL RESPONSES TO PHQ QUESTIONS 1-9: 2
10. IF YOU CHECKED OFF ANY PROBLEMS, HOW DIFFICULT HAVE THESE PROBLEMS MADE IT FOR YOU TO DO YOUR WORK, TAKE CARE OF THINGS AT HOME, OR GET ALONG WITH OTHER PEOPLE: NOT DIFFICULT AT ALL
SUM OF ALL RESPONSES TO PHQ QUESTIONS 1-9: 2

## 2024-12-18 PROBLEM — G45.9 TIA (TRANSIENT ISCHEMIC ATTACK): Status: ACTIVE | Noted: 2024-12-18

## 2025-01-16 ENCOUNTER — OFFICE VISIT (OUTPATIENT)
Dept: FAMILY MEDICINE | Facility: OTHER | Age: 70
End: 2025-01-16
Attending: NURSE PRACTITIONER
Payer: MEDICARE

## 2025-01-16 VITALS
DIASTOLIC BLOOD PRESSURE: 76 MMHG | SYSTOLIC BLOOD PRESSURE: 118 MMHG | BODY MASS INDEX: 38.53 KG/M2 | OXYGEN SATURATION: 99 % | HEART RATE: 70 BPM | WEIGHT: 224.6 LBS | RESPIRATION RATE: 16 BRPM | TEMPERATURE: 97.7 F

## 2025-01-16 DIAGNOSIS — L30.9 DERMATITIS: ICD-10-CM

## 2025-01-16 DIAGNOSIS — E78.5 HYPERLIPIDEMIA LDL GOAL <100: ICD-10-CM

## 2025-01-16 DIAGNOSIS — I10 ESSENTIAL HYPERTENSION: ICD-10-CM

## 2025-01-16 DIAGNOSIS — G45.9 TIA (TRANSIENT ISCHEMIC ATTACK): ICD-10-CM

## 2025-01-16 DIAGNOSIS — F51.01 PRIMARY INSOMNIA: Primary | ICD-10-CM

## 2025-01-16 PROCEDURE — G0463 HOSPITAL OUTPT CLINIC VISIT: HCPCS

## 2025-01-16 RX ORDER — ZOLPIDEM TARTRATE 12.5 MG/1
12.5 TABLET, FILM COATED, EXTENDED RELEASE ORAL AT BEDTIME
Qty: 90 TABLET | Refills: 1 | Status: SHIPPED | OUTPATIENT
Start: 2025-01-16

## 2025-01-16 RX ORDER — TRIAMCINOLONE ACETONIDE 1 MG/G
CREAM TOPICAL 2 TIMES DAILY
Qty: 160 G | Refills: 1 | Status: SHIPPED | OUTPATIENT
Start: 2025-01-16

## 2025-01-16 ASSESSMENT — PAIN SCALES - GENERAL: PAINLEVEL_OUTOF10: NO PAIN (0)

## 2025-01-16 NOTE — PROGRESS NOTES
Assessment & Plan     1. Primary insomnia (Primary)  Continue current plan  - zolpidem ER (AMBIEN CR) 12.5 MG CR tablet; Take 1 tablet (12.5 mg) by mouth at bedtime.  Dispense: 90 tablet; Refill: 1    2. Dermatitis  - triamcinolone (KENALOG) 0.1 % external cream; Apply topically 2 times daily.  Dispense: 160 g; Refill: 1    3. TIA (transient ischemic attack)  Follow-up with cardiology tomorrow.    ZIO patch encouraged.     4. Essential hypertension  Well controlled     5. Hyperlipidemia LDL goal <100  Continue crestor    Follow-up as scheduled.     The longitudinal plan of care for the diagnosis(es)/condition(s) as documented were addressed during this visit. Due to the added complexity in care, I will continue to support Rosa in the subsequent management and with ongoing continuity of care.    Amanda Meehan,   Certified Adult Nurse Practitioner  [phone]      Subjective   Rosa is a 69 year old, presenting for the following health issues:  Follow Up      1/16/2025    11:14 AM   Additional Questions   Roomed by Alessandro Hurtado lpn   Accompanied by daughter     History of Present Illness       Reason for visit:  Followup    She eats 2-3 servings of fruits and vegetables daily.She consumes 0 sweetened beverage(s) daily.She exercises with enough effort to increase her heart rate 20 to 29 minutes per day.  She exercises with enough effort to increase her heart rate 5 days per week.   She is taking medications regularly.       Diabetes Follow-up    How often are you checking your blood sugar? Not at all  What concerns do you have today about your diabetes? None   Do you have any of these symptoms? (Select all that apply)  No numbness or tingling in feet.  No redness, sores or blisters on feet.  No complaints of excessive thirst.  No reports of blurry vision.  No significant changes to weight.      BP Readings from Last 3 Encounters:   01/16/25 118/76   12/16/24 120/66   10/03/24 136/85    Wt Readings from  Last 3 Encounters:   01/16/25 101.9 kg (224 lb 9.6 oz)   12/16/24 104.6 kg (230 lb 11.2 oz)   10/03/24 62.6 kg (138 lb)                 Hemoglobin A1C (%)   Date Value   10/03/2024 5.9 (H)   04/19/2024 6.3 (H)     LDL Cholesterol Calculated (mg/dL)   Date Value   10/03/2024 45   04/19/2024 52         Hypertension Follow-up    Do you check your blood pressure regularly outside of the clinic? Yes   Are you following a low salt diet? No  Are your blood pressures ever more than 140 on the top number (systolic) OR more   than 90 on the bottom number (diastolic), for example 140/90? No      Cerebrovascular Follow-up    Patient history: TIA  Residual symptoms: None  Worsened or new symptoms since last visit: No  Daily aspirin use: Yes  Hypertension controlled: Yes      Insomnia  Onset/Duration: ongoing  Description:   Frequency of insomnia:  if they miss taking ambien they do not sleep  Time to fall asleep (sleep latency): 45 minutes  Middle of night awakening:  YES  Early morning awakening:  No  Progression of Symptoms:  worsening with middle of the night awakenings  Accompanying Signs & Symptoms:  Daytime sleepiness/napping: No  Excessive snoring/apnea: No  Restless legs: No  Waking to urinate: YES  Chronic pain:  No  Depression symptoms (if yes, do PHQ9): No  Anxiety symptoms (if yes, do YAZ-7): No  History:  Prior Insomnia: been ongoing for years  New stressful situation: No  Precipitating factors:   Caffeine intake: YES- drinks soda  OTC decongestants: No  Any new medications: No  Alleviating factors:  Self medicating (alcohol, etc.):  No  Stress-reduction (exercise, yoga, meditation etc): No  Therapies tried and outcome: Ambien -  usually effective      Concern - bruise on arm  Onset: last monday  Description: bruising on forearm with a red streak by inner elbow  Intensity: moderate  Progression of Symptoms:  same  Accompanying Signs & Symptoms: itching and pain  Previous history of similar problem:  none  Precipitating factors:        Worsened by: unsure  Alleviating factors:        Improved by: none  Therapies tried and outcome: anti itch ointment did not help         Review of Systems  Constitutional, neuro, ENT, endocrine, pulmonary, cardiac, gastrointestinal, genitourinary, musculoskeletal, integument and psychiatric systems are negative, except as otherwise noted.      Objective    /76 (BP Location: Right arm, Patient Position: Sitting, Cuff Size: Adult Regular)   Pulse 70   Temp 97.7  F (36.5  C) (Tympanic)   Resp 16   Wt 101.9 kg (224 lb 9.6 oz)   SpO2 99%   BMI 38.53 kg/m    Body mass index is 38.53 kg/m .  Physical Exam   GENERAL: alert and no distress  RESP: lungs clear to auscultation - no rales, rhonchi or wheezes  CV: regular rate and rhythm, normal S1 S2, no S3 or S4, no murmur, click or rub, no peripheral edema  MS: no gross musculoskeletal defects noted, no edema  SKIN: left forearm erythematous scaly patch, skin temp normal.    PSYCH: mentation appears normal, affect normal/bright            Signed Electronically by: Amanda Meehan NP

## 2025-01-17 ENCOUNTER — TRANSFERRED RECORDS (OUTPATIENT)
Dept: HEALTH INFORMATION MANAGEMENT | Facility: CLINIC | Age: 70
End: 2025-01-17

## 2025-03-04 ENCOUNTER — TELEPHONE (OUTPATIENT)
Dept: FAMILY MEDICINE | Facility: OTHER | Age: 70
End: 2025-03-04

## 2025-03-04 NOTE — TELEPHONE ENCOUNTER
[facility]  zol[facility]em ER (AMBIEN CR) 12.5 MG CR tablet. Scanned into EPIC, routed to provider.    Reasoning - Must try formularies listed below

## 2025-05-13 ENCOUNTER — HOSPITAL ENCOUNTER (OUTPATIENT)
Dept: CT IMAGING | Facility: HOSPITAL | Age: 70
Discharge: HOME | End: 2025-05-13
Attending: NURSE PRACTITIONER
Payer: MEDICARE

## 2025-05-13 DIAGNOSIS — G89.29 CHRONIC GENERALIZED ABDOMINAL PAIN: ICD-10-CM

## 2025-05-13 DIAGNOSIS — R10.84 CHRONIC GENERALIZED ABDOMINAL PAIN: ICD-10-CM

## 2025-05-13 PROCEDURE — 74176 CT ABD & PELVIS W/O CONTRAST: CPT

## 2025-05-13 PROCEDURE — 74176 CT ABD & PELVIS W/O CONTRAST: CPT | Mod: 26 | Performed by: RADIOLOGY

## 2025-05-13 NOTE — PROGRESS NOTES
Assessment & Plan     1. Diverticulitis of colon (Primary)  Abd CT and labs reviewed.   - ciprofloxacin (CIPRO) 500 MG tablet; Take 1 tablet (500 mg) by mouth 2 times daily for 10 days.  Dispense: 20 tablet; Refill: 0  - metroNIDAZOLE (FLAGYL) 500 MG tablet; Take 1 tablet (500 mg) by mouth 2 times daily for 10 days.  Dispense: 20 tablet; Refill: 0  - consider referral to GI if no improvement.     2. Long term (current) use of antibiotics  Start - saccharomyces boulardii (FLORASTOR) 250 MG capsule; Take 1 capsule (250 mg) by mouth 2 times daily.  Dispense: 60 capsule; Refill: 1    3. Essential hypertension  Stable   - diltiazem ER (CARDIAZEM LA) 120 MG 24 hr tablet; Take 1 tablet (120 mg) by mouth daily.  Dispense: 90 tablet; Refill: 1    4. Paroxysmal ventricular tachycardia (H)  - diltiazem ER (CARDIAZEM LA) 120 MG 24 hr tablet; Take 1 tablet (120 mg) by mouth daily.  Dispense: 90 tablet; Refill: 1    5. Mild recurrent major depression  Stable     6. Generalized anxiety disorder  Stable     7. Primary insomnia  Continue ambien - check with pharmacy and/or insurance company regarding coverage.       Follow-up   Follow-up in October as scheduled or as needed.     The longitudinal plan of care for the diagnosis(es)/condition(s) as documented were addressed during this visit. Due to the added complexity in care, I will continue to support Rosa in the subsequent management and with ongoing continuity of care.    Amanda Meehan,   Certified Adult Nurse Practitioner  [phone]        Subjective   Rosa is a 69 year old, presenting for the following health issues:  Gastrointestinal Problem    HPI      Pain History:  When did you first notice your pain? 2-3 months   Have you seen this provider for your pain in the past? Yes   Where in your body do you have pain? abdomen  Are you seeing anyone else for your pain? No, had CT scan completed.  Diverticulosis noted with inflammatory changes        6/24/2024     10:31 AM 12/16/2024     1:23 PM 4/4/2025     9:47 AM   PHQ-9 SCORE   PHQ-9 Total Score MyChart 0 2 (Minimal depression) 1 (Minimal depression)   PHQ-9 Total Score 0 2  1        Patient-reported           6/24/2024    10:32 AM 12/16/2024     1:24 PM 4/4/2025     9:48 AM   YAZ-7 SCORE   Total Score 1 (minimal anxiety) 1 (minimal anxiety) 5 (mild anxiety)   Total Score 1 1  5        Patient-reported       Chronic Pain Follow Up:  Location of pain: abdomen  Analgesia/pain control:    - Recent changes:  none    - Overall control: Tolerable with discomfort    - Current treatments: none   Adherence:     - Do you ever take more pain medicine than prescribed? N/A    - When did you take your last dose of pain medicine?  N/A   Adverse effects: No   PDMP Review         Value Time User    State PDMP site checked  Yes 10/31/2024  3:41 PM Aysha Melendez MD          Last CSA Agreement:   CSA -- Patient Level:    CSA: None found at the patient level.       Last UDS:     Recent Labs   Lab Test 04/04/25  1057 10/03/24  1611 04/19/24  1149 10/21/22  1108 07/27/19  1353   A1C 6.2* 5.9* 6.3*   < >  --    LDL 47 45 52   < >  --    HDL 51 56 62   < >  --    TRIG 105 102 107   < >  --    ALT 48 27 11   < > 14   CR 1.14* 1.15* 0.79   < > 0.82   GFRESTIMATED 52* 51* 81   < > 76   GFRESTBLACK  --   --   --   --  88   POTASSIUM 4.4 4.1 4.4   < > 3.9   TSH 1.00 1.25 1.24   < >  --     < > = values in this interval not displayed.      BP Readings from Last 3 Encounters:   05/15/25 124/80   04/04/25 93/69   01/16/25 118/76    Wt Readings from Last 3 Encounters:   04/04/25 93.9 kg (207 lb)   01/16/25 101.9 kg (224 lb 9.6 oz)   12/16/24 104.6 kg (230 lb 11.2 oz)               Review of Systems  Constitutional, HEENT, cardiovascular, pulmonary, GI, , musculoskeletal, neuro, skin, endocrine and psych systems are negative, except as otherwise noted.      Objective    /80 (BP Location: Right arm, Patient Position: Sitting, Cuff Size: Adult  Regular)   Pulse 66   Temp 98  F (36.7  C) (Tympanic)   Resp 18   SpO2 96%   There is no height or weight on file to calculate BMI.  Physical Exam   GENERAL: alert and no distress  NECK: no adenopathy, no asymmetry, masses, or scars, thyroid normal to palpation, and no carotid bruits  RESP: lungs clear to auscultation - no rales, rhonchi or wheezes  CV: regular rate and rhythm, normal S1 S2, no S3 or S4, no murmur, click or rub, no peripheral edema  MS: no gross musculoskeletal defects noted, no edema  PSYCH: mentation appears normal, affect normal/bright    Office Visit on 04/04/2025   Component Date Value Ref Range Status    Estimated Average Glucose 04/04/2025 131 (H)  <117 mg/dL Final    Hemoglobin A1C 04/04/2025 6.2 (H)  <5.7 % Final    Normal <5.7%   Prediabetes 5.7-6.4%    Diabetes 6.5% or higher     Note: Adopted from ADA consensus guidelines.    Cholesterol 04/04/2025 119  <200 mg/dL Final    Triglycerides 04/04/2025 105  <150 mg/dL Final    Direct Measure HDL 04/04/2025 51  >=50 mg/dL Final    LDL Cholesterol Calculated 04/04/2025 47  <100 mg/dL Final    Non HDL Cholesterol 04/04/2025 68  <130 mg/dL Final    Patient Fasting > 8hrs? 04/04/2025 No   Final    Sodium 04/04/2025 141  135 - 145 mmol/L Final    Potassium 04/04/2025 4.4  3.4 - 5.3 mmol/L Final    Carbon Dioxide (CO2) 04/04/2025 26  22 - 29 mmol/L Final    Anion Gap 04/04/2025 9  7 - 15 mmol/L Final    Urea Nitrogen 04/04/2025 9.9  8.0 - 23.0 mg/dL Final    Creatinine 04/04/2025 1.14 (H)  0.51 - 0.95 mg/dL Final    GFR Estimate 04/04/2025 52 (L)  >60 mL/min/1.73m2 Final    eGFR calculated using 2021 CKD-EPI equation.    Calcium 04/04/2025 9.0  8.8 - 10.4 mg/dL Final    Chloride 04/04/2025 106  98 - 107 mmol/L Final    Glucose 04/04/2025 111 (H)  70 - 99 mg/dL Final    Alkaline Phosphatase 04/04/2025 67  40 - 150 U/L Final    AST 04/04/2025 38  0 - 45 U/L Final    ALT 04/04/2025 48  0 - 50 U/L Final    Protein Total 04/04/2025 6.8  6.4 - 8.3  g/dL Final    Albumin 04/04/2025 4.1  3.5 - 5.2 g/dL Final    Bilirubin Total 04/04/2025 1.3 (H)  <=1.2 mg/dL Final    Patient Fasting > 8hrs? 04/04/2025 No   Final    TSH 04/04/2025 1.00  0.30 - 4.20 uIU/mL Final    Amylase 04/04/2025 43  28 - 100 U/L Final    Lipase 04/04/2025 15  13 - 60 U/L Final    WBC Count 04/04/2025 6.9  4.0 - 11.0 10e3/uL Final    RBC Count 04/04/2025 5.10  3.80 - 5.20 10e6/uL Final    Hemoglobin 04/04/2025 13.7  11.7 - 15.7 g/dL Final    Hematocrit 04/04/2025 43.0  35.0 - 47.0 % Final    MCV 04/04/2025 84  78 - 100 fL Final    MCH 04/04/2025 26.9  26.5 - 33.0 pg Final    MCHC 04/04/2025 31.9  31.5 - 36.5 g/dL Final    RDW 04/04/2025 14.9  10.0 - 15.0 % Final    Platelet Count 04/04/2025 277  150 - 450 10e3/uL Final    % Neutrophils 04/04/2025 60  % Final    % Lymphocytes 04/04/2025 25  % Final    % Monocytes 04/04/2025 10  % Final    % Eosinophils 04/04/2025 5  % Final    % Basophils 04/04/2025 1  % Final    % Immature Granulocytes 04/04/2025 0  % Final    Absolute Neutrophils 04/04/2025 4.1  1.6 - 8.3 10e3/uL Final    Absolute Lymphocytes 04/04/2025 1.7  0.8 - 5.3 10e3/uL Final    Absolute Monocytes 04/04/2025 0.7  0.0 - 1.3 10e3/uL Final    Absolute Eosinophils 04/04/2025 0.3  0.0 - 0.7 10e3/uL Final    Absolute Basophils 04/04/2025 0.1  0.0 - 0.2 10e3/uL Final    Absolute Immature Granulocytes 04/04/2025 0.0  <=0.4 10e3/uL Final         Narrative & Impression   PROCEDURE: CT ABDOMEN PELVIS W/O CONTRAST 5/13/2025 10:27 AM     HISTORY: [facility]; Chronic generalized abdominal pain;  Chronic generalized abdominal pain     COMPARISONS: None.     TECHNIQUE: Axial noncontrast enhanced images with coronal and sagittal  reformatted images.     FINDINGS: Lung bases are relatively clear.     No focal abnormality is seen in the liver, spleen, adrenal glands or  in the pancreas. Evaluation is limited by lack of intravenous contrast  material.     There is been a cholecystectomy.     No  definite renal mass is seen. There is no hydronephrosis. There is  no renal stone.     There has been previous gastric surgery. There is been previous bowel  surgery. No significant bowel distention is seen.     There is sigmoid diverticulosis. There is some minimal inflammation in  the fat around the mid sigmoid colon and some mild diverticulitis  cannot be excluded. No abscess or fluid collection is seen.     No lymph node enlargement is seen. There is no aneurysm. There is no  focal bone lesion. Multifocal degenerative change is seen in the  spine, most severe at the L4-5 level.                                                                        IMPRESSION:   1. Questionable mild sigmoid diverticulosis. Only mild inflammation.  No abscess or other fluid collection.  2. Postoperative changes in stomach and bowel. Prior cholecystectomy.     MICHAEL ROBERTSON MD         SYSTEM ID:  [id number]       Signed Electronically by: Amanda Meehan NP

## 2025-05-15 ENCOUNTER — OFFICE VISIT (OUTPATIENT)
Dept: FAMILY MEDICINE | Facility: OTHER | Age: 70
End: 2025-05-15
Attending: NURSE PRACTITIONER
Payer: MEDICARE

## 2025-05-15 ENCOUNTER — RESULTS FOLLOW-UP (OUTPATIENT)
Dept: FAMILY MEDICINE | Facility: OTHER | Age: 70
End: 2025-05-15

## 2025-05-15 VITALS
DIASTOLIC BLOOD PRESSURE: 80 MMHG | OXYGEN SATURATION: 96 % | HEART RATE: 66 BPM | TEMPERATURE: 98 F | RESPIRATION RATE: 18 BRPM | SYSTOLIC BLOOD PRESSURE: 124 MMHG

## 2025-05-15 DIAGNOSIS — K57.32 DIVERTICULITIS OF COLON: Primary | ICD-10-CM

## 2025-05-15 DIAGNOSIS — Z79.2 LONG TERM (CURRENT) USE OF ANTIBIOTICS: ICD-10-CM

## 2025-05-15 DIAGNOSIS — F41.1 GENERALIZED ANXIETY DISORDER: ICD-10-CM

## 2025-05-15 DIAGNOSIS — I47.20 PAROXYSMAL VENTRICULAR TACHYCARDIA (H): ICD-10-CM

## 2025-05-15 DIAGNOSIS — F51.01 PRIMARY INSOMNIA: ICD-10-CM

## 2025-05-15 DIAGNOSIS — F33.0 MILD RECURRENT MAJOR DEPRESSION: ICD-10-CM

## 2025-05-15 DIAGNOSIS — I10 ESSENTIAL HYPERTENSION: ICD-10-CM

## 2025-05-15 PROCEDURE — G0463 HOSPITAL OUTPT CLINIC VISIT: HCPCS | Mod: 25

## 2025-05-15 RX ORDER — METRONIDAZOLE 500 MG/1
500 TABLET ORAL 2 TIMES DAILY
Qty: 20 TABLET | Refills: 0 | Status: SHIPPED | OUTPATIENT
Start: 2025-05-15 | End: 2025-05-25

## 2025-05-15 RX ORDER — CIPROFLOXACIN 500 MG/1
500 TABLET, FILM COATED ORAL 2 TIMES DAILY
Qty: 20 TABLET | Refills: 0 | Status: SHIPPED | OUTPATIENT
Start: 2025-05-15 | End: 2025-05-25

## 2025-05-15 RX ORDER — SACCHAROMYCES BOULARDII 250 MG
250 CAPSULE ORAL 2 TIMES DAILY
Qty: 60 CAPSULE | Refills: 1 | Status: SHIPPED | OUTPATIENT
Start: 2025-05-15

## 2025-05-15 RX ORDER — DILTIAZEM HYDROCHLORIDE 120 MG/1
120 CAPSULE, COATED, EXTENDED RELEASE ORAL
COMMUNITY
Start: 2024-07-29 | End: 2025-05-15

## 2025-05-15 RX ORDER — DILTIAZEM HYDROCHLORIDE EXTENDED-RELEASE TABLETS 120 MG/1
120 TABLET, EXTENDED RELEASE ORAL DAILY
Qty: 90 TABLET | Refills: 1 | Status: SHIPPED | OUTPATIENT
Start: 2025-05-15

## 2025-05-15 ASSESSMENT — PAIN SCALES - GENERAL: PAINLEVEL_OUTOF10: MILD PAIN (2)

## 2025-06-09 ENCOUNTER — TELEPHONE (OUTPATIENT)
Dept: FAMILY MEDICINE | Facility: OTHER | Age: 70
End: 2025-06-09

## 2025-06-09 NOTE — TELEPHONE ENCOUNTER
PA requested for zolpidem ER (AMBIEN CR) 12.5 MG CR tablet. Submitted on CMM, no auth required. No further action needed by PA team.

## 2025-07-15 ENCOUNTER — TELEPHONE (OUTPATIENT)
Dept: FAMILY MEDICINE | Facility: OTHER | Age: 70
End: 2025-07-15

## 2025-07-15 NOTE — TELEPHONE ENCOUNTER
Symptom or reason needing to speak to RN: ER Follow Up / 7/6/25 / Terri Aaliyah / Bad Ear Infection, possible ruptured ear drum, fluid behind ear drum / Advised to follow up if doesn't get better - not better    Best number to return call: [phone]     Best time to return call: Anytime

## 2025-07-15 NOTE — TELEPHONE ENCOUNTER
"Date of ER/UC Visit:  7/6/25    Location: [facility] ED    Reason for ER/UC Visit:  Non-recurrent acute suppurative otitis media of left ear without spontaneous rupture of tympanic membrane     Medication Changes:    Ordered Prescriptions  Prescription Sig Dispense Quantity Refills Last Filled Start Date End Date   cefdinir (Omnicef) 300 MG capsule   Indications: Infection Take 1 Capsule by mouth every 12 hours for 7 days. Indications: Infection 14 Capsule      07/07/2025 07/14/2025       Medication picked up/started: yes, completed    Symptoms improved or worsened: Ear is still plugged, shooting pain once in a while- jaw pain improved.  Ringing in ear still present, constant    ER/UC follow up recommended:  Make an appointment for clinic follow-up in the next 1 to 3 days to make sure this is working     Questions/concern: Symptoms as above    Appointment Scheduled:      Future Appointments 7/15/2025 - 1/11/2026        Date Visit Type Length Department Provider     7/16/2025  1:30 PM ED/HOSP FOLLOW UP 30 min [facility] Amanda Meehan NP    Location Instructions:     \"[facility] is located off  on [address]. Enter through the front entrance to register for your appointment.\"              10/6/2025 10:00 AM OFFICE VISIT 30 min [facility] Amanda Meehan NP    Location Instructions:     \"[facility] is located off  on [address]. Enter through the front entrance to register for your appointment.\"                         "

## 2025-07-16 ENCOUNTER — OFFICE VISIT (OUTPATIENT)
Dept: FAMILY MEDICINE | Facility: OTHER | Age: 70
End: 2025-07-16
Attending: NURSE PRACTITIONER
Payer: MEDICARE

## 2025-07-16 VITALS
WEIGHT: 195 LBS | TEMPERATURE: 98 F | BODY MASS INDEX: 33.29 KG/M2 | SYSTOLIC BLOOD PRESSURE: 121 MMHG | DIASTOLIC BLOOD PRESSURE: 77 MMHG | HEIGHT: 64 IN | HEART RATE: 65 BPM | RESPIRATION RATE: 18 BRPM | OXYGEN SATURATION: 97 %

## 2025-07-16 DIAGNOSIS — H66.92 OTITIS MEDIA TREATED WITH ANTIBIOTICS IN THE PAST 60 DAYS, LEFT: Primary | ICD-10-CM

## 2025-07-16 DIAGNOSIS — F41.1 GENERALIZED ANXIETY DISORDER: ICD-10-CM

## 2025-07-16 DIAGNOSIS — F33.0 MILD RECURRENT MAJOR DEPRESSION: ICD-10-CM

## 2025-07-16 PROCEDURE — G0463 HOSPITAL OUTPT CLINIC VISIT: HCPCS

## 2025-07-16 RX ORDER — CIPROFLOXACIN AND DEXAMETHASONE 3; 1 MG/ML; MG/ML
4 SUSPENSION/ DROPS AURICULAR (OTIC) 2 TIMES DAILY
Qty: 7.5 ML | Refills: 0 | Status: SHIPPED | OUTPATIENT
Start: 2025-07-16 | End: 2025-07-23

## 2025-07-16 RX ORDER — DULOXETIN HYDROCHLORIDE 60 MG/1
CAPSULE, DELAYED RELEASE ORAL
Qty: 90 CAPSULE | Refills: 1 | Status: SHIPPED | OUTPATIENT
Start: 2025-07-16

## 2025-07-16 RX ORDER — SULFAMETHOXAZOLE AND TRIMETHOPRIM 800; 160 MG/1; MG/1
1 TABLET ORAL 2 TIMES DAILY
Qty: 20 TABLET | Refills: 0 | Status: SHIPPED | OUTPATIENT
Start: 2025-07-16 | End: 2025-07-26

## 2025-07-16 ASSESSMENT — PAIN SCALES - GENERAL: PAINLEVEL_OUTOF10: MILD PAIN (2)

## 2025-07-16 NOTE — PROGRESS NOTES
"  Assessment & Plan     1. Otitis media treated with antibiotics in the past 60 days, left (Primary)  ED notes reviewed.   - sulfamethoxazole-trimethoprim (BACTRIM DS) 800-160 MG tablet; Take 1 tablet by mouth 2 times daily for 10 days.  Dispense: 20 tablet; Refill: 0  - ciprofloxacin-dexAMETHasone (CIPRODEX) 0.3-0.1 % otic suspension; Place 4 drops Into the left ear 2 times daily for 7 days.  Dispense: 7.5 mL; Refill: 0    MED REC REQUIRED  Post Medication Reconciliation Status: discharge medications reconciled and changed, per note/orders    BMI  Estimated body mass index is 33.7 kg/m  as calculated from the following:    Height as of this encounter: 1.62 m (5' 3.78\").    Weight as of this encounter: 88.5 kg (195 lb).   Weight management plan: Discussed healthy diet and exercise guidelines    Follow-up   Follow-up if no improvement or any worsening.     The longitudinal plan of care for the diagnosis(es)/condition(s) as documented were addressed during this visit. Due to the added complexity in care, I will continue to support Rosa in the subsequent management and with ongoing continuity of care.    Amanda Meehan,   Certified Adult Nurse Practitioner  [phone]     Subjective   Rosa is a 70 year old, presenting for the following health issues:  ER F/U        7/16/2025     1:17 PM   Additional Questions   Roomed by Cristina JOLLEY   Accompanied by Daughter Ana Paula DORAN      ED/UC Followup:    Facility:  [facility]  Date of visit: 7/6/25  Reason for visit: OM Lt ear  Current Status: plugged and ringing    Completed cefdinar, no improvement.  Pain continues.  Notes decreased hearing, muffled and feels plugged.      Recent Labs   Lab Test 04/04/25  1057 10/03/24  1611 04/19/24  1149 10/21/22  1108 07/27/19  1353   A1C 6.2* 5.9* 6.3*   < >  --    LDL 47 45 52   < >  --    HDL 51 56 62   < >  --    TRIG 105 102 107   < >  --    ALT 48 27 11   < > 14   CR 1.14* 1.15* 0.79   < > 0.82   GFRESTIMATED 52* 51* 81   " "< > 76   GFRESTBLACK  --   --   --   --  88   POTASSIUM 4.4 4.1 4.4   < > 3.9   TSH 1.00 1.25 1.24   < >  --     < > = values in this interval not displayed.      BP Readings from Last 3 Encounters:   07/16/25 121/77   05/15/25 124/80   04/04/25 93/69    Wt Readings from Last 3 Encounters:   07/16/25 88.5 kg (195 lb)   04/04/25 93.9 kg (207 lb)   01/16/25 101.9 kg (224 lb 9.6 oz)                Review of Systems  Constitutional, HEENT, cardiovascular, pulmonary, GI, , musculoskeletal, neuro, skin, endocrine and psych systems are negative, except as otherwise noted.      Objective    /77 (BP Location: Right arm, Patient Position: Sitting, Cuff Size: Adult Large)   Pulse 65   Temp 98  F (36.7  C) (Tympanic)   Resp 18   Ht 1.62 m (5' 3.78\")   Wt 88.5 kg (195 lb)   SpO2 97%   Breastfeeding No   BMI 33.70 kg/m    Body mass index is 33.7 kg/m .  Physical Exam   GENERAL: alert and no distress  HENT: normal cephalic/atraumatic, right ear: normal: no effusions, no erythema, normal landmarks, left ear: erythematous, bulging membrane, and mucopurulent effusion, nose and mouth without ulcers or lesions, and oropharynx clear  NECK: no adenopathy, no asymmetry, masses, or scars  RESP: lungs clear to auscultation - no rales, rhonchi or wheezes  CV: regular rate and rhythm, normal S1 S2, no S3 or S4, no murmur  MS: no gross musculoskeletal defects noted, no edema  PSYCH: mentation appears normal, affect normal/bright            Signed Electronically by: Amanda Meehan NP    "

## 2025-07-16 NOTE — TELEPHONE ENCOUNTER
DULOXETINE DR 60MG CAPSULES       Last Written Prescription Date:  4/4/25  Last Fill Quantity: 90,   # refills: 1  Last Office Visit: 5/15/25  Future Office visit:    Next 5 appointments (look out 90 days)      Oct 06, 2025 10:00 AM  (Arrive by 9:45 AM)  Provider Visit with Amanda Meehan NP  [facility] ([facility] ) [address]  [facility] [address]  [phone]             Routing refill request to provider for review/approval because:  Serotonin-Norepinephrine Reuptake Inhibitors  [id number]/16/2025 08:00 AM   Protocol Details   Medication is active on med list and the sig matches. RN to manually verify dose and sig if red X/fail.

## 2025-08-02 DIAGNOSIS — R73.03 PRE-DIABETES: ICD-10-CM

## 2025-08-13 DIAGNOSIS — I47.20 PAROXYSMAL VENTRICULAR TACHYCARDIA (H): ICD-10-CM

## 2025-08-13 DIAGNOSIS — I10 ESSENTIAL HYPERTENSION: ICD-10-CM

## 2025-08-13 RX ORDER — DILTIAZEM HYDROCHLORIDE EXTENDED-RELEASE TABLETS 120 MG/1
120 TABLET, EXTENDED RELEASE ORAL DAILY
Qty: 90 TABLET | Refills: 2 | Status: SHIPPED | OUTPATIENT
Start: 2025-08-13

## (undated) DEVICE — FORCEPS BIOPSY RADIAL JAW 4 LARGE W/NEEDLE 240CM M00513332

## (undated) DEVICE — TUBING SUCTION 20FT N620A

## (undated) DEVICE — CONNECTOR ERBEFLO 2 PORT 20325-215

## (undated) DEVICE — SOL WATER IRRIG 1000ML BOTTLE 2F7114

## (undated) RX ORDER — PROPOFOL 10 MG/ML
INJECTION, EMULSION INTRAVENOUS
Status: DISPENSED
Start: 2023-02-24